# Patient Record
Sex: MALE | Race: WHITE | NOT HISPANIC OR LATINO | Employment: OTHER | URBAN - METROPOLITAN AREA
[De-identification: names, ages, dates, MRNs, and addresses within clinical notes are randomized per-mention and may not be internally consistent; named-entity substitution may affect disease eponyms.]

---

## 2020-08-04 ENCOUNTER — HISTORICAL (OUTPATIENT)
Dept: ADMINISTRATIVE | Facility: HOSPITAL | Age: 65
End: 2020-08-04

## 2020-08-04 LAB
ABS NEUT (OLG): 3.21 X10(3)/MCL (ref 2.1–9.2)
ALBUMIN SERPL-MCNC: 3.6 GM/DL (ref 3.4–5)
ALBUMIN/GLOB SERPL: 1.2 RATIO (ref 1.1–2)
ALP SERPL-CCNC: 79 UNIT/L (ref 40–150)
ALT SERPL-CCNC: 11 UNIT/L (ref 0–55)
AST SERPL-CCNC: 15 UNIT/L (ref 5–34)
BASOPHILS # BLD AUTO: 0 X10(3)/MCL (ref 0–0.2)
BASOPHILS NFR BLD AUTO: 1 %
BILIRUB SERPL-MCNC: 1.6 MG/DL
BILIRUBIN DIRECT+TOT PNL SERPL-MCNC: 0.5 MG/DL (ref 0–0.5)
BILIRUBIN DIRECT+TOT PNL SERPL-MCNC: 1.1 MG/DL (ref 0–0.8)
BUN SERPL-MCNC: 16 MG/DL (ref 8.4–25.7)
CALCIUM SERPL-MCNC: 8.9 MG/DL (ref 8.8–10)
CHLORIDE SERPL-SCNC: 102 MMOL/L (ref 98–107)
CHOLEST SERPL-MCNC: 156 MG/DL
CHOLEST/HDLC SERPL: 5 {RATIO} (ref 0–5)
CO2 SERPL-SCNC: 31 MMOL/L (ref 23–31)
CREAT SERPL-MCNC: 1.19 MG/DL (ref 0.73–1.18)
EOSINOPHIL # BLD AUTO: 0.2 X10(3)/MCL (ref 0–0.9)
EOSINOPHIL NFR BLD AUTO: 3 %
ERYTHROCYTE [DISTWIDTH] IN BLOOD BY AUTOMATED COUNT: 12.9 % (ref 11.5–17)
GLOBULIN SER-MCNC: 2.9 GM/DL (ref 2.4–3.5)
GLUCOSE SERPL-MCNC: 95 MG/DL (ref 82–115)
HCT VFR BLD AUTO: 44.1 % (ref 42–52)
HDLC SERPL-MCNC: 32 MG/DL (ref 35–60)
HGB BLD-MCNC: 14.5 GM/DL (ref 14–18)
LDLC SERPL CALC-MCNC: 102 MG/DL (ref 50–140)
LYMPHOCYTES # BLD AUTO: 1.4 X10(3)/MCL (ref 0.6–4.6)
LYMPHOCYTES NFR BLD AUTO: 26 %
MCH RBC QN AUTO: 31 PG (ref 27–31)
MCHC RBC AUTO-ENTMCNC: 32.9 GM/DL (ref 33–36)
MCV RBC AUTO: 94.4 FL (ref 80–94)
MONOCYTES # BLD AUTO: 0.5 X10(3)/MCL (ref 0.1–1.3)
MONOCYTES NFR BLD AUTO: 10 %
NEUTROPHILS # BLD AUTO: 3.21 X10(3)/MCL (ref 2.1–9.2)
NEUTROPHILS NFR BLD AUTO: 61 %
PLATELET # BLD AUTO: 288 X10(3)/MCL (ref 130–400)
PMV BLD AUTO: 10.6 FL (ref 9.4–12.4)
POTASSIUM SERPL-SCNC: 4.5 MMOL/L (ref 3.5–5.1)
PROT SERPL-MCNC: 6.5 GM/DL (ref 5.8–7.6)
RBC # BLD AUTO: 4.67 X10(6)/MCL (ref 4.7–6.1)
SODIUM SERPL-SCNC: 139 MMOL/L (ref 136–145)
TRIGL SERPL-MCNC: 108 MG/DL (ref 34–140)
VLDLC SERPL CALC-MCNC: 22 MG/DL
WBC # SPEC AUTO: 5.3 X10(3)/MCL (ref 4.5–11.5)

## 2021-08-16 ENCOUNTER — HISTORICAL (OUTPATIENT)
Dept: ADMINISTRATIVE | Facility: HOSPITAL | Age: 66
End: 2021-08-16

## 2021-08-16 LAB
ABS NEUT (OLG): 3.56 X10(3)/MCL (ref 2.1–9.2)
ALBUMIN SERPL-MCNC: 3.6 GM/DL (ref 3.4–4.8)
ALBUMIN/GLOB SERPL: 1.3 RATIO (ref 1.1–2)
ALP SERPL-CCNC: 77 UNIT/L (ref 40–150)
ALT SERPL-CCNC: 12 UNIT/L (ref 0–55)
AST SERPL-CCNC: 17 UNIT/L (ref 5–34)
BASOPHILS # BLD AUTO: 0 X10(3)/MCL (ref 0–0.2)
BASOPHILS NFR BLD AUTO: 1 %
BILIRUB SERPL-MCNC: 1.1 MG/DL
BILIRUBIN DIRECT+TOT PNL SERPL-MCNC: 0.4 MG/DL (ref 0–0.5)
BILIRUBIN DIRECT+TOT PNL SERPL-MCNC: 0.7 MG/DL (ref 0–0.8)
BUN SERPL-MCNC: 17.7 MG/DL (ref 8.4–25.7)
CALCIUM SERPL-MCNC: 9 MG/DL (ref 8.8–10)
CHLORIDE SERPL-SCNC: 100 MMOL/L (ref 98–107)
CHOLEST SERPL-MCNC: 172 MG/DL
CHOLEST/HDLC SERPL: 6 {RATIO} (ref 0–5)
CO2 SERPL-SCNC: 28 MMOL/L (ref 23–31)
CREAT SERPL-MCNC: 1.11 MG/DL (ref 0.73–1.18)
EOSINOPHIL # BLD AUTO: 0.2 X10(3)/MCL (ref 0–0.9)
EOSINOPHIL NFR BLD AUTO: 3 %
ERYTHROCYTE [DISTWIDTH] IN BLOOD BY AUTOMATED COUNT: 13.1 % (ref 11.5–17)
GLOBULIN SER-MCNC: 2.8 GM/DL (ref 2.4–3.5)
GLUCOSE SERPL-MCNC: 81 MG/DL (ref 82–115)
HCT VFR BLD AUTO: 42.9 % (ref 42–52)
HDLC SERPL-MCNC: 29 MG/DL (ref 35–60)
HGB BLD-MCNC: 13.9 GM/DL (ref 14–18)
LDLC SERPL CALC-MCNC: 111 MG/DL (ref 50–140)
LYMPHOCYTES # BLD AUTO: 1.6 X10(3)/MCL (ref 0.6–4.6)
LYMPHOCYTES NFR BLD AUTO: 27 %
MAGNESIUM SERPL-MCNC: 2.3 MG/DL (ref 1.6–2.6)
MCH RBC QN AUTO: 30.2 PG (ref 27–31)
MCHC RBC AUTO-ENTMCNC: 32.4 GM/DL (ref 33–36)
MCV RBC AUTO: 93.3 FL (ref 80–94)
MONOCYTES # BLD AUTO: 0.6 X10(3)/MCL (ref 0.1–1.3)
MONOCYTES NFR BLD AUTO: 10 %
NEUTROPHILS # BLD AUTO: 3.56 X10(3)/MCL (ref 2.1–9.2)
NEUTROPHILS NFR BLD AUTO: 58 %
PLATELET # BLD AUTO: 328 X10(3)/MCL (ref 130–400)
PMV BLD AUTO: 11.1 FL (ref 9.4–12.4)
POTASSIUM SERPL-SCNC: 4.2 MMOL/L (ref 3.5–5.1)
PROT SERPL-MCNC: 6.4 GM/DL (ref 5.8–7.6)
RBC # BLD AUTO: 4.6 X10(6)/MCL (ref 4.7–6.1)
SODIUM SERPL-SCNC: 138 MMOL/L (ref 136–145)
TRIGL SERPL-MCNC: 160 MG/DL (ref 34–140)
VLDLC SERPL CALC-MCNC: 32 MG/DL
WBC # SPEC AUTO: 6.1 X10(3)/MCL (ref 4.5–11.5)

## 2022-08-24 ENCOUNTER — HOSPITAL ENCOUNTER (EMERGENCY)
Facility: HOSPITAL | Age: 67
Discharge: LEFT BEFORE DISCHARGE | End: 2022-08-25
Payer: MEDICARE

## 2022-08-24 VITALS
DIASTOLIC BLOOD PRESSURE: 91 MMHG | SYSTOLIC BLOOD PRESSURE: 156 MMHG | HEIGHT: 67 IN | BODY MASS INDEX: 36.1 KG/M2 | HEART RATE: 80 BPM | RESPIRATION RATE: 20 BRPM | OXYGEN SATURATION: 96 % | WEIGHT: 230 LBS | TEMPERATURE: 98 F

## 2022-08-24 PROCEDURE — 99283 EMERGENCY DEPT VISIT LOW MDM: CPT | Mod: 25

## 2022-09-14 ENCOUNTER — HOSPITAL ENCOUNTER (INPATIENT)
Facility: HOSPITAL | Age: 67
LOS: 2 days | Discharge: HOME | DRG: 193 | End: 2022-09-17
Attending: EMERGENCY MEDICINE | Admitting: INTERNAL MEDICINE
Payer: MEDICARE

## 2022-09-14 DIAGNOSIS — R55 SYNCOPE: ICD-10-CM

## 2022-09-14 DIAGNOSIS — R07.81 RIB PAIN: ICD-10-CM

## 2022-09-14 DIAGNOSIS — R21 RASH: ICD-10-CM

## 2022-09-14 DIAGNOSIS — J18.9 PNEUMONIA OF RIGHT LOWER LOBE DUE TO INFECTIOUS ORGANISM: ICD-10-CM

## 2022-09-14 DIAGNOSIS — R07.9 ACUTE CHEST PAIN: ICD-10-CM

## 2022-09-14 DIAGNOSIS — R07.9 CHEST PAIN: ICD-10-CM

## 2022-09-14 DIAGNOSIS — Z87.891 EX-SMOKER: ICD-10-CM

## 2022-09-14 DIAGNOSIS — R09.02 HYPOXIA: ICD-10-CM

## 2022-09-14 DIAGNOSIS — R53.1 WEAKNESS: Primary | ICD-10-CM

## 2022-09-14 PROCEDURE — 94761 N-INVAS EAR/PLS OXIMETRY MLT: CPT

## 2022-09-14 PROCEDURE — 84484 ASSAY OF TROPONIN QUANT: CPT | Performed by: EMERGENCY MEDICINE

## 2022-09-14 PROCEDURE — 83880 ASSAY OF NATRIURETIC PEPTIDE: CPT | Performed by: EMERGENCY MEDICINE

## 2022-09-14 PROCEDURE — 85025 COMPLETE CBC W/AUTO DIFF WBC: CPT | Performed by: EMERGENCY MEDICINE

## 2022-09-14 PROCEDURE — 80053 COMPREHEN METABOLIC PANEL: CPT | Performed by: EMERGENCY MEDICINE

## 2022-09-14 PROCEDURE — 99291 CRITICAL CARE FIRST HOUR: CPT | Mod: 25

## 2022-09-14 PROCEDURE — 87636 SARSCOV2 & INF A&B AMP PRB: CPT | Performed by: EMERGENCY MEDICINE

## 2022-09-14 PROCEDURE — 96376 TX/PRO/DX INJ SAME DRUG ADON: CPT

## 2022-09-14 PROCEDURE — 83605 ASSAY OF LACTIC ACID: CPT | Performed by: EMERGENCY MEDICINE

## 2022-09-14 PROCEDURE — 93010 ELECTROCARDIOGRAM REPORT: CPT | Mod: ,,, | Performed by: INTERNAL MEDICINE

## 2022-09-14 PROCEDURE — 96365 THER/PROPH/DIAG IV INF INIT: CPT

## 2022-09-14 PROCEDURE — 36415 COLL VENOUS BLD VENIPUNCTURE: CPT | Performed by: EMERGENCY MEDICINE

## 2022-09-14 PROCEDURE — 96375 TX/PRO/DX INJ NEW DRUG ADDON: CPT

## 2022-09-14 PROCEDURE — 93005 ELECTROCARDIOGRAM TRACING: CPT

## 2022-09-14 PROCEDURE — 25000242 PHARM REV CODE 250 ALT 637 W/ HCPCS: Performed by: EMERGENCY MEDICINE

## 2022-09-14 PROCEDURE — 93010 EKG 12-LEAD: ICD-10-PCS | Mod: ,,, | Performed by: INTERNAL MEDICINE

## 2022-09-14 PROCEDURE — 94640 AIRWAY INHALATION TREATMENT: CPT

## 2022-09-14 RX ORDER — ACETAMINOPHEN AND CODEINE PHOSPHATE 300; 30 MG/1; MG/1
1 TABLET ORAL
COMMUNITY
Start: 2022-09-03

## 2022-09-14 RX ORDER — DICLOFENAC SODIUM 75 MG/1
TABLET, DELAYED RELEASE ORAL
Status: ON HOLD | COMMUNITY
Start: 2022-09-07 | End: 2022-09-17 | Stop reason: HOSPADM

## 2022-09-14 RX ORDER — HYDROCHLOROTHIAZIDE 25 MG/1
25 TABLET ORAL DAILY
Status: ON HOLD | COMMUNITY
Start: 2022-07-12 | End: 2022-09-17 | Stop reason: HOSPADM

## 2022-09-14 RX ORDER — IPRATROPIUM BROMIDE AND ALBUTEROL SULFATE 2.5; .5 MG/3ML; MG/3ML
3 SOLUTION RESPIRATORY (INHALATION)
Status: COMPLETED | OUTPATIENT
Start: 2022-09-14 | End: 2022-09-14

## 2022-09-14 RX ORDER — ISOSORBIDE DINITRATE 5 MG/1
5 TABLET ORAL DAILY
COMMUNITY
Start: 2022-09-10

## 2022-09-14 RX ORDER — PRAZOSIN HYDROCHLORIDE 2 MG/1
2 CAPSULE ORAL 2 TIMES DAILY
COMMUNITY
Start: 2022-09-14

## 2022-09-14 RX ORDER — ATORVASTATIN CALCIUM 10 MG/1
TABLET, FILM COATED ORAL
COMMUNITY
Start: 2022-09-14

## 2022-09-14 RX ORDER — AMLODIPINE BESYLATE 2.5 MG/1
2.5 TABLET ORAL DAILY
COMMUNITY
Start: 2022-07-25

## 2022-09-14 RX ORDER — CLOTRIMAZOLE AND BETAMETHASONE DIPROPIONATE 10; .64 MG/G; MG/G
CREAM TOPICAL
COMMUNITY
Start: 2022-09-07

## 2022-09-14 RX ADMIN — IPRATROPIUM BROMIDE AND ALBUTEROL SULFATE 3 ML: 2.5; .5 SOLUTION RESPIRATORY (INHALATION) at 11:09

## 2022-09-15 PROBLEM — R55 MICTURITION SYNCOPE: Status: ACTIVE | Noted: 2022-09-15

## 2022-09-15 PROBLEM — J18.9 CAP (COMMUNITY ACQUIRED PNEUMONIA): Status: ACTIVE | Noted: 2022-09-15

## 2022-09-15 LAB
ALBUMIN SERPL-MCNC: 3.9 GM/DL (ref 3.4–4.8)
ALBUMIN/GLOB SERPL: 1.3 RATIO (ref 1.1–2)
ALP SERPL-CCNC: 72 UNIT/L (ref 40–150)
ALT SERPL-CCNC: 21 UNIT/L (ref 0–55)
ANION GAP SERPL CALC-SCNC: 15 MEQ/L
APPEARANCE UR: CLEAR
AST SERPL-CCNC: 21 UNIT/L (ref 5–34)
BACTERIA #/AREA URNS AUTO: NORMAL /HPF
BASOPHILS # BLD AUTO: 0.04 X10(3)/MCL (ref 0–0.2)
BASOPHILS NFR BLD AUTO: 0.5 %
BILIRUB UR QL STRIP.AUTO: NEGATIVE MG/DL
BILIRUBIN DIRECT+TOT PNL SERPL-MCNC: 0.7 MG/DL
BNP BLD-MCNC: 20 PG/ML
BSA FOR ECHO PROCEDURE: 2.24 M2
BUN SERPL-MCNC: 20.9 MG/DL (ref 8.4–25.7)
BUN SERPL-MCNC: 21.5 MG/DL (ref 8.4–25.7)
CALCIUM SERPL-MCNC: 9.3 MG/DL (ref 8.8–10)
CALCIUM SERPL-MCNC: 9.7 MG/DL (ref 8.8–10)
CHLORIDE SERPL-SCNC: 101 MMOL/L (ref 98–107)
CHLORIDE SERPL-SCNC: 98 MMOL/L (ref 98–107)
CO2 SERPL-SCNC: 23 MMOL/L (ref 23–31)
CO2 SERPL-SCNC: 28 MMOL/L (ref 23–31)
COLOR UR AUTO: YELLOW
CREAT SERPL-MCNC: 1.2 MG/DL (ref 0.73–1.18)
CREAT SERPL-MCNC: 1.24 MG/DL (ref 0.73–1.18)
CREAT/UREA NIT SERPL: 17
EJECTION FRACTION: 60 %
EOSINOPHIL # BLD AUTO: 0.39 X10(3)/MCL (ref 0–0.9)
EOSINOPHIL NFR BLD AUTO: 4.6 %
ERYTHROCYTE [DISTWIDTH] IN BLOOD BY AUTOMATED COUNT: 12.9 % (ref 11.5–17)
ERYTHROCYTE [DISTWIDTH] IN BLOOD BY AUTOMATED COUNT: 13 % (ref 11.5–17)
FLUAV AG UPPER RESP QL IA.RAPID: NOT DETECTED
FLUBV AG UPPER RESP QL IA.RAPID: NOT DETECTED
GFR SERPLBLD CREATININE-BSD FMLA CKD-EPI: >60 MLS/MIN/1.73/M2
GFR SERPLBLD CREATININE-BSD FMLA CKD-EPI: >60 MLS/MIN/1.73/M2
GLOBULIN SER-MCNC: 3.1 GM/DL (ref 2.4–3.5)
GLUCOSE SERPL-MCNC: 133 MG/DL (ref 82–115)
GLUCOSE SERPL-MCNC: 206 MG/DL (ref 82–115)
GLUCOSE UR QL STRIP.AUTO: NEGATIVE MG/DL
HCT VFR BLD AUTO: 44.4 % (ref 42–52)
HCT VFR BLD AUTO: 44.7 % (ref 42–52)
HGB BLD-MCNC: 14.4 GM/DL (ref 14–18)
HGB BLD-MCNC: 14.7 GM/DL (ref 14–18)
IMM GRANULOCYTES # BLD AUTO: 0.08 X10(3)/MCL (ref 0–0.04)
IMM GRANULOCYTES NFR BLD AUTO: 1 %
KETONES UR QL STRIP.AUTO: NEGATIVE MG/DL
LACTATE SERPL-SCNC: 1.6 MMOL/L (ref 0.5–2.2)
LACTATE SERPL-SCNC: 2.5 MMOL/L (ref 0.5–2.2)
LACTATE SERPL-SCNC: 2.7 MMOL/L (ref 0.5–2.2)
LACTATE SERPL-SCNC: 4.2 MMOL/L (ref 0.5–2.2)
LACTATE SERPL-SCNC: 4.9 MMOL/L (ref 0.5–2.2)
LEFT VENTRICLE DIASTOLIC VOLUME INDEX: 54.38 ML/M2
LEFT VENTRICLE DIASTOLIC VOLUME: 118 ML
LEFT VENTRICLE SYSTOLIC VOLUME INDEX: 17.1 ML/M2
LEFT VENTRICLE SYSTOLIC VOLUME: 37.2 ML
LEUKOCYTE ESTERASE UR QL STRIP.AUTO: ABNORMAL UNIT/L
LYMPHOCYTES # BLD AUTO: 2.82 X10(3)/MCL (ref 0.6–4.6)
LYMPHOCYTES NFR BLD AUTO: 33.5 %
MAGNESIUM SERPL-MCNC: 1.9 MG/DL (ref 1.6–2.6)
MCH RBC QN AUTO: 31 PG (ref 27–31)
MCH RBC QN AUTO: 31.1 PG (ref 27–31)
MCHC RBC AUTO-ENTMCNC: 32.4 MG/DL (ref 33–36)
MCHC RBC AUTO-ENTMCNC: 32.9 MG/DL (ref 33–36)
MCV RBC AUTO: 94.7 FL (ref 80–94)
MCV RBC AUTO: 95.7 FL (ref 80–94)
MONOCYTES # BLD AUTO: 0.59 X10(3)/MCL (ref 0.1–1.3)
MONOCYTES NFR BLD AUTO: 7 %
NEUTROPHILS # BLD AUTO: 4.5 X10(3)/MCL (ref 2.1–9.2)
NEUTROPHILS NFR BLD AUTO: 53.4 %
NITRITE UR QL STRIP.AUTO: NEGATIVE
NRBC BLD AUTO-RTO: 0 %
NRBC BLD AUTO-RTO: 0 %
PH UR STRIP.AUTO: 5.5 [PH]
PHOSPHATE SERPL-MCNC: 4.8 MG/DL (ref 2.3–4.7)
PLATELET # BLD AUTO: 257 X10(3)/MCL (ref 130–400)
PLATELET # BLD AUTO: 297 X10(3)/MCL (ref 130–400)
PMV BLD AUTO: 10.9 FL (ref 7.4–10.4)
PMV BLD AUTO: 11.2 FL (ref 7.4–10.4)
POTASSIUM SERPL-SCNC: 3.4 MMOL/L (ref 3.5–5.1)
POTASSIUM SERPL-SCNC: 4.1 MMOL/L (ref 3.5–5.1)
PROT SERPL-MCNC: 7 GM/DL (ref 5.8–7.6)
PROT UR QL STRIP.AUTO: NEGATIVE MG/DL
RA PRESSURE: 3 MMHG
RBC # BLD AUTO: 4.64 X10(6)/MCL (ref 4.7–6.1)
RBC # BLD AUTO: 4.72 X10(6)/MCL (ref 4.7–6.1)
RBC #/AREA URNS AUTO: <5 /HPF
RBC UR QL AUTO: NEGATIVE UNIT/L
SARS-COV-2 RNA RESP QL NAA+PROBE: NOT DETECTED
SODIUM SERPL-SCNC: 136 MMOL/L (ref 136–145)
SODIUM SERPL-SCNC: 140 MMOL/L (ref 136–145)
SP GR UR STRIP.AUTO: 1.02 (ref 1–1.03)
SQUAMOUS #/AREA URNS AUTO: <5 /HPF
TROPONIN I SERPL-MCNC: <0.01 NG/ML (ref 0–0.04)
UROBILINOGEN UR STRIP-ACNC: 0.2 MG/DL
WBC # SPEC AUTO: 10 X10(3)/MCL (ref 4.5–11.5)
WBC # SPEC AUTO: 8.4 X10(3)/MCL (ref 4.5–11.5)
WBC #/AREA URNS AUTO: <5 /HPF

## 2022-09-15 PROCEDURE — 27000221 HC OXYGEN, UP TO 24 HOURS

## 2022-09-15 PROCEDURE — 63600175 PHARM REV CODE 636 W HCPCS

## 2022-09-15 PROCEDURE — 25500020 PHARM REV CODE 255: Performed by: EMERGENCY MEDICINE

## 2022-09-15 PROCEDURE — 80048 BASIC METABOLIC PNL TOTAL CA: CPT | Performed by: INTERNAL MEDICINE

## 2022-09-15 PROCEDURE — 94799 UNLISTED PULMONARY SVC/PX: CPT

## 2022-09-15 PROCEDURE — 94640 AIRWAY INHALATION TREATMENT: CPT

## 2022-09-15 PROCEDURE — 84484 ASSAY OF TROPONIN QUANT: CPT | Performed by: INTERNAL MEDICINE

## 2022-09-15 PROCEDURE — 84100 ASSAY OF PHOSPHORUS: CPT | Performed by: INTERNAL MEDICINE

## 2022-09-15 PROCEDURE — 25000242 PHARM REV CODE 250 ALT 637 W/ HCPCS: Performed by: INTERNAL MEDICINE

## 2022-09-15 PROCEDURE — 85027 COMPLETE CBC AUTOMATED: CPT | Performed by: INTERNAL MEDICINE

## 2022-09-15 PROCEDURE — 36415 COLL VENOUS BLD VENIPUNCTURE: CPT | Performed by: INTERNAL MEDICINE

## 2022-09-15 PROCEDURE — 99900031 HC PATIENT EDUCATION (STAT)

## 2022-09-15 PROCEDURE — 25000003 PHARM REV CODE 250: Performed by: HOSPITALIST

## 2022-09-15 PROCEDURE — 63600175 PHARM REV CODE 636 W HCPCS: Performed by: INTERNAL MEDICINE

## 2022-09-15 PROCEDURE — 63700000 PHARM REV CODE 250 ALT 637 W/O HCPCS: Performed by: INTERNAL MEDICINE

## 2022-09-15 PROCEDURE — 25000003 PHARM REV CODE 250: Performed by: INTERNAL MEDICINE

## 2022-09-15 PROCEDURE — 81001 URINALYSIS AUTO W/SCOPE: CPT | Performed by: INTERNAL MEDICINE

## 2022-09-15 PROCEDURE — 83735 ASSAY OF MAGNESIUM: CPT | Performed by: INTERNAL MEDICINE

## 2022-09-15 PROCEDURE — 63600175 PHARM REV CODE 636 W HCPCS: Performed by: EMERGENCY MEDICINE

## 2022-09-15 PROCEDURE — 25000003 PHARM REV CODE 250: Performed by: EMERGENCY MEDICINE

## 2022-09-15 PROCEDURE — 94761 N-INVAS EAR/PLS OXIMETRY MLT: CPT

## 2022-09-15 PROCEDURE — 83605 ASSAY OF LACTIC ACID: CPT | Performed by: EMERGENCY MEDICINE

## 2022-09-15 PROCEDURE — 11000001 HC ACUTE MED/SURG PRIVATE ROOM

## 2022-09-15 RX ORDER — TALC
6 POWDER (GRAM) TOPICAL NIGHTLY PRN
Status: DISCONTINUED | OUTPATIENT
Start: 2022-09-15 | End: 2022-09-17 | Stop reason: HOSPADM

## 2022-09-15 RX ORDER — ENOXAPARIN SODIUM 100 MG/ML
40 INJECTION SUBCUTANEOUS EVERY 24 HOURS
Status: DISCONTINUED | OUTPATIENT
Start: 2022-09-15 | End: 2022-09-17 | Stop reason: HOSPADM

## 2022-09-15 RX ORDER — ONDANSETRON 2 MG/ML
4 INJECTION INTRAMUSCULAR; INTRAVENOUS
Status: COMPLETED | OUTPATIENT
Start: 2022-09-15 | End: 2022-09-15

## 2022-09-15 RX ORDER — ALBUTEROL SULFATE 0.83 MG/ML
2.5 SOLUTION RESPIRATORY (INHALATION) EVERY 4 HOURS PRN
Status: DISCONTINUED | OUTPATIENT
Start: 2022-09-15 | End: 2022-09-17 | Stop reason: HOSPADM

## 2022-09-15 RX ORDER — AMOXICILLIN 250 MG
1 CAPSULE ORAL 2 TIMES DAILY PRN
Status: DISCONTINUED | OUTPATIENT
Start: 2022-09-15 | End: 2022-09-17 | Stop reason: HOSPADM

## 2022-09-15 RX ORDER — SIMETHICONE 80 MG
1 TABLET,CHEWABLE ORAL 4 TIMES DAILY PRN
Status: DISCONTINUED | OUTPATIENT
Start: 2022-09-15 | End: 2022-09-17 | Stop reason: HOSPADM

## 2022-09-15 RX ORDER — POTASSIUM CHLORIDE 20 MEQ/1
40 TABLET, EXTENDED RELEASE ORAL ONCE
Status: COMPLETED | OUTPATIENT
Start: 2022-09-15 | End: 2022-09-15

## 2022-09-15 RX ORDER — SODIUM CHLORIDE, SODIUM LACTATE, POTASSIUM CHLORIDE, CALCIUM CHLORIDE 600; 310; 30; 20 MG/100ML; MG/100ML; MG/100ML; MG/100ML
INJECTION, SOLUTION INTRAVENOUS CONTINUOUS
Status: DISCONTINUED | OUTPATIENT
Start: 2022-09-15 | End: 2022-09-15

## 2022-09-15 RX ORDER — GUAIFENESIN/DEXTROMETHORPHAN 100-10MG/5
5 SYRUP ORAL EVERY 4 HOURS PRN
Status: DISCONTINUED | OUTPATIENT
Start: 2022-09-15 | End: 2022-09-17 | Stop reason: HOSPADM

## 2022-09-15 RX ORDER — METHOCARBAMOL 750 MG/1
750 TABLET, FILM COATED ORAL 4 TIMES DAILY PRN
Status: DISCONTINUED | OUTPATIENT
Start: 2022-09-15 | End: 2022-09-17 | Stop reason: HOSPADM

## 2022-09-15 RX ORDER — HYDROMORPHONE HYDROCHLORIDE 2 MG/ML
1 INJECTION, SOLUTION INTRAMUSCULAR; INTRAVENOUS; SUBCUTANEOUS
Status: COMPLETED | OUTPATIENT
Start: 2022-09-15 | End: 2022-09-15

## 2022-09-15 RX ORDER — KETOROLAC TROMETHAMINE 30 MG/ML
15 INJECTION, SOLUTION INTRAMUSCULAR; INTRAVENOUS
Status: COMPLETED | OUTPATIENT
Start: 2022-09-15 | End: 2022-09-15

## 2022-09-15 RX ORDER — AZITHROMYCIN 250 MG/1
500 TABLET, FILM COATED ORAL NIGHTLY
Status: DISCONTINUED | OUTPATIENT
Start: 2022-09-15 | End: 2022-09-16

## 2022-09-15 RX ORDER — FUROSEMIDE 10 MG/ML
40 INJECTION INTRAMUSCULAR; INTRAVENOUS
Status: COMPLETED | OUTPATIENT
Start: 2022-09-15 | End: 2022-09-15

## 2022-09-15 RX ORDER — HYDROCODONE BITARTRATE AND ACETAMINOPHEN 5; 325 MG/1; MG/1
1 TABLET ORAL EVERY 6 HOURS PRN
Status: DISCONTINUED | OUTPATIENT
Start: 2022-09-15 | End: 2022-09-16

## 2022-09-15 RX ORDER — ACETAMINOPHEN AND CODEINE PHOSPHATE 300; 30 MG/1; MG/1
1 TABLET ORAL EVERY 4 HOURS PRN
Status: DISCONTINUED | OUTPATIENT
Start: 2022-09-15 | End: 2022-09-17 | Stop reason: HOSPADM

## 2022-09-15 RX ORDER — FAMOTIDINE 20 MG/1
20 TABLET, FILM COATED ORAL 2 TIMES DAILY
Status: DISCONTINUED | OUTPATIENT
Start: 2022-09-15 | End: 2022-09-17 | Stop reason: HOSPADM

## 2022-09-15 RX ORDER — SODIUM CHLORIDE 0.9 % (FLUSH) 0.9 %
10 SYRINGE (ML) INJECTION
Status: DISCONTINUED | OUTPATIENT
Start: 2022-09-15 | End: 2022-09-17 | Stop reason: HOSPADM

## 2022-09-15 RX ORDER — ONDANSETRON 2 MG/ML
4 INJECTION INTRAMUSCULAR; INTRAVENOUS ONCE
Status: COMPLETED | OUTPATIENT
Start: 2022-09-15 | End: 2022-09-15

## 2022-09-15 RX ORDER — POLYETHYLENE GLYCOL 3350 17 G/17G
17 POWDER, FOR SOLUTION ORAL 2 TIMES DAILY PRN
Status: DISCONTINUED | OUTPATIENT
Start: 2022-09-15 | End: 2022-09-17 | Stop reason: HOSPADM

## 2022-09-15 RX ORDER — ACETAMINOPHEN 325 MG/1
650 TABLET ORAL EVERY 4 HOURS PRN
Status: DISCONTINUED | OUTPATIENT
Start: 2022-09-15 | End: 2022-09-17 | Stop reason: HOSPADM

## 2022-09-15 RX ORDER — MAG HYDROX/ALUMINUM HYD/SIMETH 200-200-20
30 SUSPENSION, ORAL (FINAL DOSE FORM) ORAL 4 TIMES DAILY PRN
Status: DISCONTINUED | OUTPATIENT
Start: 2022-09-15 | End: 2022-09-17 | Stop reason: HOSPADM

## 2022-09-15 RX ORDER — BENZONATATE 100 MG/1
200 CAPSULE ORAL 3 TIMES DAILY PRN
Status: DISCONTINUED | OUTPATIENT
Start: 2022-09-15 | End: 2022-09-17 | Stop reason: HOSPADM

## 2022-09-15 RX ORDER — ONDANSETRON 2 MG/ML
4 INJECTION INTRAMUSCULAR; INTRAVENOUS EVERY 4 HOURS PRN
Status: DISCONTINUED | OUTPATIENT
Start: 2022-09-15 | End: 2022-09-17 | Stop reason: HOSPADM

## 2022-09-15 RX ORDER — ONDANSETRON 2 MG/ML
INJECTION INTRAMUSCULAR; INTRAVENOUS
Status: COMPLETED
Start: 2022-09-15 | End: 2022-09-15

## 2022-09-15 RX ORDER — ACETAMINOPHEN 500 MG
1000 TABLET ORAL EVERY 6 HOURS PRN
Status: DISCONTINUED | OUTPATIENT
Start: 2022-09-15 | End: 2022-09-17 | Stop reason: HOSPADM

## 2022-09-15 RX ORDER — PROCHLORPERAZINE EDISYLATE 5 MG/ML
5 INJECTION INTRAMUSCULAR; INTRAVENOUS EVERY 6 HOURS PRN
Status: DISCONTINUED | OUTPATIENT
Start: 2022-09-15 | End: 2022-09-17 | Stop reason: HOSPADM

## 2022-09-15 RX ORDER — BISOPROLOL FUMARATE AND HYDROCHLOROTHIAZIDE 5; 6.25 MG/1; MG/1
1 TABLET ORAL DAILY
COMMUNITY
Start: 2022-06-27

## 2022-09-15 RX ADMIN — HYDROMORPHONE HYDROCHLORIDE 1 MG: 2 INJECTION, SOLUTION INTRAMUSCULAR; INTRAVENOUS; SUBCUTANEOUS at 02:09

## 2022-09-15 RX ADMIN — ACETAMINOPHEN AND CODEINE PHOSPHATE 1 TABLET: 300; 30 TABLET ORAL at 08:09

## 2022-09-15 RX ADMIN — MELATONIN TAB 3 MG 6 MG: 3 TAB at 08:09

## 2022-09-15 RX ADMIN — FUROSEMIDE 40 MG: 10 INJECTION, SOLUTION INTRAMUSCULAR; INTRAVENOUS at 12:09

## 2022-09-15 RX ADMIN — IOPAMIDOL 100 ML: 755 INJECTION, SOLUTION INTRAVENOUS at 01:09

## 2022-09-15 RX ADMIN — FAMOTIDINE 20 MG: 20 TABLET, FILM COATED ORAL at 08:09

## 2022-09-15 RX ADMIN — AZITHROMYCIN MONOHYDRATE 500 MG: 500 INJECTION, POWDER, LYOPHILIZED, FOR SOLUTION INTRAVENOUS at 03:09

## 2022-09-15 RX ADMIN — ONDANSETRON 4 MG: 2 INJECTION INTRAMUSCULAR; INTRAVENOUS at 03:09

## 2022-09-15 RX ADMIN — POTASSIUM CHLORIDE 40 MEQ: 1500 TABLET, EXTENDED RELEASE ORAL at 04:09

## 2022-09-15 RX ADMIN — CEFTRIAXONE SODIUM 1 G: 1 INJECTION, POWDER, FOR SOLUTION INTRAMUSCULAR; INTRAVENOUS at 02:09

## 2022-09-15 RX ADMIN — ALBUTEROL SULFATE 2.5 MG: 2.5 SOLUTION RESPIRATORY (INHALATION) at 07:09

## 2022-09-15 RX ADMIN — HYDROMORPHONE HYDROCHLORIDE 1 MG: 2 INJECTION, SOLUTION INTRAMUSCULAR; INTRAVENOUS; SUBCUTANEOUS at 12:09

## 2022-09-15 RX ADMIN — METHOCARBAMOL 750 MG: 750 TABLET ORAL at 08:09

## 2022-09-15 RX ADMIN — CEFTRIAXONE SODIUM 1 G: 1 INJECTION, POWDER, FOR SOLUTION INTRAMUSCULAR; INTRAVENOUS at 08:09

## 2022-09-15 RX ADMIN — SODIUM CHLORIDE, POTASSIUM CHLORIDE, SODIUM LACTATE AND CALCIUM CHLORIDE: 600; 310; 30; 20 INJECTION, SOLUTION INTRAVENOUS at 03:09

## 2022-09-15 RX ADMIN — AZITHROMYCIN MONOHYDRATE 500 MG: 250 TABLET ORAL at 08:09

## 2022-09-15 RX ADMIN — ONDANSETRON 4 MG: 2 INJECTION INTRAMUSCULAR; INTRAVENOUS at 12:09

## 2022-09-15 RX ADMIN — SODIUM CHLORIDE, POTASSIUM CHLORIDE, SODIUM LACTATE AND CALCIUM CHLORIDE 1000 ML: 600; 310; 30; 20 INJECTION, SOLUTION INTRAVENOUS at 02:09

## 2022-09-15 RX ADMIN — ACETAMINOPHEN AND CODEINE PHOSPHATE 1 TABLET: 300; 30 TABLET ORAL at 03:09

## 2022-09-15 RX ADMIN — SODIUM BICARBONATE: 84 INJECTION, SOLUTION INTRAVENOUS at 04:09

## 2022-09-15 RX ADMIN — KETOROLAC TROMETHAMINE 15 MG: 30 INJECTION, SOLUTION INTRAMUSCULAR; INTRAVENOUS at 02:09

## 2022-09-15 RX ADMIN — ENOXAPARIN SODIUM 40 MG: 40 INJECTION SUBCUTANEOUS at 04:09

## 2022-09-15 NOTE — H&P
[facility] Hospital Medicine   History & Physical Note      Patient Name: Dallin Heath  : 1955  MRN: [id number]  Patient Class: IP- Inpatient   Admission Date: 2022   Length of Stay: 0  Admitting Physician:  Service  Attending Physician: Caroline Chand MD  PCP: Primary Doctor No  Source of history: Patient, patient's family, and EMR.   Face-to-Face encounter date: 09/15/2022  Code status: --Full      Chief Complaint   Shortness of Breath (Prescribed new med by Cardio, took 1st dose at 1900 tonight. Got up to go to bathroom at approx 2200, +weakness. Fell to knees. + L lower ribs pain. SPO2 on EMS arrival 88%. Placed on 4L NC, only up to 89%. Diaphoretic on arrival)      History of Present Illness   Mr. Hetah is a 67 year old male who presented to the Ed with c/o sob, syncope at home tonight.  He states that he was seen by his cardiologist today for a routine checkup and had an US which the patient states he was told he was fine and was prescribed prazosin due to urinary difficulty.  He states he took the medication this evening along with a sleep aid and back pain medication. He states he got up around 10 p.m. to urinate and had a syncopal episode.  Wife states that patient has been having back pain x several weeks and not moving around much. Today's Ed lab work revealed K+ 3.4, Cr 1.2, lactate 2.7. Troponin, BNP wnl. Flu/Covid negative. CTA negative for PE. Showed basal atelectatic bands with patchy elements at posterior right lung base.  He was given azithromycin and rocephin in the ED. He was admitted to hospital medicine for management.    ROS   Except as documented, all other systems reviewed and negative     Past Medical History     Past Medical History:   Diagnosis Date    HLD (hyperlipidemia)     Hypertension        Past Surgical History   Denies past surgery    Social History     Social History     Tobacco Use    Smoking status: Former     Types: Cigarettes     Smokeless tobacco: Never   Substance Use Topics    Alcohol use: Yes        Family History   Reviewed and negative    Allergies   Patient has no known allergies.    Home Medications     Prior to Admission medications    Medication Sig Start Date End Date Taking? Authorizing Provider   acetaminophen-codeine 300-30mg (TYLENOL #3) 300-30 mg Tab Take 1 tablet by mouth. 9/3/22   Historical Provider   amLODIPine (NORVASC) 2.5 MG tablet Take 2.5 mg by mouth once daily. 22   Historical Provider   atorvastatin (LIPITOR) 10 MG tablet Take by mouth. 22   Historical Provider   clotrimazole-betamethasone 1-0.05% (LOTRISONE) cream APPLY TO THE AFFECTED AREA(S) TWICE DAILY 22   Historical Provider   diclofenac (VOLTAREN) 75 MG EC tablet SMARTSI Tablet(s) By Mouth Every 12 Hours PRN 22   Historical Provider   hydroCHLOROthiazide (HYDRODIURIL) 25 MG tablet Take 25 mg by mouth once daily. 22   Historical Provider   isosorbide dinitrate (ISORDIL) 5 MG Tab Take 5 mg by mouth once daily. 9/10/22   Historical Provider   prazosin (MINIPRESS) 2 MG Cap Take 2 mg by mouth 2 (two) times daily. 22   Historical Provider        Inpatient Medications   Scheduled Meds   azithromycin  500 mg Intravenous ED 1 Time    azithromycin  500 mg Oral QHS    cefTRIAXone (ROCEPHIN) IVPB  1 g Intravenous Q24H    enoxaparin  40 mg Subcutaneous Daily    famotidine  20 mg Oral BID    potassium chloride  40 mEq Oral Once     Continuous Infusions   lactated ringers       PRN Meds      Physical Exam   Vital Signs  Temp:  [97.5 °F (36.4 °C)]   Pulse:  [87-99]   Resp:  [16-22]   BP: (121-164)/()   SpO2:  [88 %-95 %]       General: Awake, alert, oriented, currently vomiting.  HEENT: NC/AT  Neck:  Supple  Chest: Clear bilaterally, no wheezing or rales, no accessory muscle use. O2 via face mask in place  CVS: Regular rhythm. Tachycardic. Normal S1/S2. Lower left chest wall ttp  Abdomen: nondistended, normoactive BS, soft and  non-tender.  MSK: No obvious deformity or joint swelling  Skin: Warm and dry  Neuro: AAOx3, no focal neurological deficit  Psych: Cooperative    Labs     Recent Labs     09/14/22  2337   WBC 8.4   RBC 4.72   HGB 14.7   HCT 44.7   MCV 94.7*   MCH 31.1*   MCHC 32.9*   RDW 12.9        Recent Labs     09/14/22  2338   LACTIC 2.7*     No results for input(s): INR, APTT, D-DIMER in the last 72 hours.  No results for input(s): HGBA1C, CHOL, TRIG, LDL, VLDL, HDL in the last 72 hours.   Recent Labs     09/14/22  2337      K 3.4*   CHLORIDE 101   CO2 28   BUN 20.9   CREATININE 1.20*   GLUCOSE 133*   CALCIUM 9.7   ALBUMIN 3.9   GLOBULIN 3.1   ALKPHOS 72   ALT 21   AST 21   BILITOT 0.7     Recent Labs     09/14/22  2337   BNP 20.0   TROPONINI <0.010          Microbiology Results (last 7 days)       ** No results found for the last 168 hours. **           Imaging     CTA Chest Non-Coronary(PE Studies)         X-Ray Chest AP Portable    (Results Pending)     Assessment & Plan   Syncope likely micturition  Community acquired pneumonia vs atelectasis  Acute kidney injury  Lactic acidosis  Hypokalemia      Plan:  -Telemetry monitoring  -O2 delivery as needed  -Azithromycin 500 mg q 24 hours  -Rocephin 1 gram q 24 hours  -Potassium replacement  -LR @ 100 ml/hr  -Duonebs q4 prn  -Pain control  -IS at bedside  -Echo pending  -Carotid US pending  -Repeat lactate  -Troponin q 6 hours until peak  -Resume home meds as appropriate once reconciled  -Labs in AM      VTE Prophylaxis: Lovenox    I, Elise Erickson NP have reviewed and discussed this case with Dr. Chand.  Please see addendum for further assessment and plan from attending MD.

## 2022-09-15 NOTE — ED PROVIDER NOTES
"Encounter Date: 9/14/2022    SCRIBE #1 NOTE: I, Delia Clark, am scribing for, and in the presence of,  Jake Monson MD. I have scribed the following portions of the note - Other sections scribed: HPI, ROS, PE.     History     Chief Complaint   Patient presents with    Shortness of Breath     Prescribed new med by Cardio, took 1st dose at 1900 tonight. Got up to go to bathroom at approx 2200, +weakness. Fell to knees. + L lower ribs pain. SPO2 on EMS arrival 88%. Placed on 4L NC, only up to 89%. Diaphoretic on arrival       This is a 67 y.o. male, with a PMHx of HTN and HLD, who presents with complaint of shortness of breath after passing out at home.   Pt reports that he went to his cardiologist, Dr. Morales, for yearly routine visit, mentioned some urinary difficulty and was given a "new medicine". Pt states that he took the new medication at approximately 7 PM, and he took his other medications as well as a sleep aid. Pt notes that at around 10 PM he went to go use the restroom when he felt weak and dizzy, which caused him to fall. Pt reports that he has the urgency to urinate, but cannot urinate. Pt adds that he is experiencing SOB. Pt reports that it is hard to breathe. He denies SOB when lying flat or waking up out of his sleep because of the SOB. He denies being on any home oxygen. Pt adds that he has a rash on his left chest area that appeared 1 week ago. He states that the rash was not painful until today. Pt describes the rash pain as a burning sensation. He denies any contact with poison ivy or poison oak. Pt denies fever, cough, and abdominal pain. Pt reports that he quit smoking cigarettes.       The history is provided by the patient.   Male  Problem  Primary symptoms comment: Difficulty urinating. This is a new problem. The current episode started several days ago. Pertinent negatives include no nausea, no vomiting and no abdominal pain.   Review of patient's allergies indicates:  No Known " Allergies  Past Medical History:   Diagnosis Date    HLD (hyperlipidemia)     Hypertension      No past surgical history on file.  No family history on file.  Social History     Tobacco Use    Smoking status: Never    Smokeless tobacco: Never     Review of Systems   Constitutional:  Negative for chills and fever.   Respiratory:  Positive for shortness of breath. Negative for cough and chest tightness.    Cardiovascular:  Negative for chest pain.   Gastrointestinal:  Negative for abdominal pain, nausea and vomiting.   Genitourinary:  Positive for difficulty urinating and urgency.   Musculoskeletal:  Negative for myalgias and neck pain.   Skin:  Positive for rash.   Neurological:  Positive for dizziness and weakness. Negative for syncope.   All other systems reviewed and are negative.    Physical Exam     Initial Vitals [09/14/22 2305]   BP Pulse Resp Temp SpO2   (!) 156/79 98 20 97.5 °F (36.4 °C) (!) 88 %      MAP       --         Physical Exam    Nursing note and vitals reviewed.  Constitutional: He appears well-developed and well-nourished. He appears distressed.   HENT:   Head: Normocephalic and atraumatic.   Eyes: Conjunctivae are normal.   Cardiovascular:  Normal rate.           Pulmonary/Chest: He has no rhonchi. He exhibits no tenderness.   Mild expiratory wheezes. Crackles.   Abdominal: Abdomen is soft. He exhibits no distension. There is no abdominal tenderness. There is no rebound and no guarding.   Musculoskeletal:         General: Normal range of motion.     Neurological: He is alert and oriented to person, place, and time. He has normal strength.   Skin: Skin is warm and dry.   Pale pink rash that is raised on the left lower chest. Dermatome pattern. No vesicles.   Psychiatric: He has a normal mood and affect.       ED Course   Critical Care    Date/Time: 9/15/2022 2:54 AM  Performed by: Jake Monson MD  Authorized by: Jake Monson MD   Total critical care time (exclusive of procedural time) :  33 minutes  Critical care was necessary to treat or prevent imminent or life-threatening deterioration of the following conditions: cardiac failure, circulatory failure, sepsis and respiratory failure.  Critical care was time spent personally by me on the following activities: evaluation of patient's response to treatment, examination of patient, ordering and performing treatments and interventions, ordering and review of laboratory studies, ordering and review of radiographic studies, pulse oximetry and re-evaluation of patient's condition.      Labs Reviewed   COMPREHENSIVE METABOLIC PANEL - Abnormal; Notable for the following components:       Result Value    Potassium Level 3.4 (*)     Glucose Level 133 (*)     Creatinine 1.20 (*)     All other components within normal limits   LACTIC ACID, PLASMA - Abnormal; Notable for the following components:    Lactic Acid Level 2.7 (*)     All other components within normal limits   CBC WITH DIFFERENTIAL - Abnormal; Notable for the following components:    MCV 94.7 (*)     MCH 31.1 (*)     MCHC 32.9 (*)     MPV 11.2 (*)     IG# 0.08 (*)     All other components within normal limits   COVID/FLU A&B PCR - Normal   TROPONIN I - Normal   B-TYPE NATRIURETIC PEPTIDE - Normal   CBC W/ AUTO DIFFERENTIAL    Narrative:     The following orders were created for panel order CBC auto differential.  Procedure                               Abnormality         Status                     ---------                               -----------         ------                     CBC with Differential[[id number]]        Abnormal            Final result                 Please view results for these tests on the individual orders.   LACTIC ACID, PLASMA          Imaging Results              CTA Chest Non-Coronary(PE Studies) (Preliminary result)  Result time 09/15/22 01:32:14      Preliminary result by Paulo Rice Jr., MD (09/15/22 01:32:14)                   Narrative:    START OF  REPORT:  Technique: CT Scan of the chest was performed with intravenous contrast with direct axial images as well as sagittal and coronal reconstruction images pulmonary embolus protocol.    Dosage Information: Automated Exposure Control was utilized.    Comparison: None.    Clinical History: Prescribed new med by Cardio, took 1st dose at 1900 tonight. Got up to go to bathroom at approx 2200, +weakness. Fell to knees. + L lower ribs pain. SPO2 on EMS arrival 88%. Placed on 4L NC, only up to 89%. Diaphoretic on arrival.    Findings:  Soft Tissues: Unremarkable.  Lines and Tubes: None.  Axilla: A few prominent lymph nodes are seen in the axilla.  Neck: The visualized soft tissues of the neck appear unremarkable. The thyroid gland appear unremarkable.  Mediastinum: The mediastinal structures are within normal limits.  Heart: The heart appears unremarkable.  Aorta: Moderate aortic calcification is seen in the thoracic aorta. There is right sided aortic arch seen.  Pulmonary Arteries: No filling defects are seen in the pulmonary arteries to suggest pulmonary embolus.  Lungs: There is pronounced non specific dependent change at the lung bases. Basal atelectatic bands. Patchy elements at the posterior right lung base. A superimposed developing pneumonia here is not excluded.  Pleura: No effusions or pneumothorax are identified.  Bony Structures:  Spine: Moderate spondylolytic changes are seen in the thoracic spine. Schmorl's node is seen in the inferior endplate of D12 vertebral body.  Ribs: The ribs appear unremarkable.  Abdomen: There is a well defined hypodense lesion with peripheral vascularity seen in segment IV B of the liver on series 8 image 312. The findings may be suggestive of a haemangioma.      Impression:  1. No filling defects are seen in the pulmonary arteries to suggest pulmonary embolus.  2. Basal atelectatic bands. Patchy elements at the posterior right lung base. A superimposed developing pneumonia here  is not excluded.  3. Details and other findings as discussed above.                          Preliminary result by Interface, Rad Results In (09/15/22 01:32:14)                   Narrative:    START OF REPORT:  Technique: CT Scan of the chest was performed with intravenous contrast with direct axial images as well as sagittal and coronal reconstruction images pulmonary embolus protocol.    Dosage Information: Automated Exposure Control was utilized.    Comparison: None.    Clinical History: Prescribed new med by Cardio, took 1st dose at 1900 tonight. Got up to go to bathroom at approx 2200, +weakness. Fell to knees. + L lower ribs pain. SPO2 on EMS arrival 88%. Placed on 4L NC, only up to 89%. Diaphoretic on arrival.    Findings:  Soft Tissues: Unremarkable.  Lines and Tubes: None.  Axilla: A few prominent lymph nodes are seen in the axilla.  Neck: The visualized soft tissues of the neck appear unremarkable. The thyroid gland appear unremarkable.  Mediastinum: The mediastinal structures are within normal limits.  Heart: The heart appears unremarkable.  Aorta: Moderate aortic calcification is seen in the thoracic aorta. There is right sided aortic arch seen.  Pulmonary Arteries: No filling defects are seen in the pulmonary arteries to suggest pulmonary embolus.  Lungs: There is pronounced non specific dependent change at the lung bases. Basal atelectatic bands. Patchy elements at the posterior right lung base. A superimposed developing pneumonia here is not excluded.  Pleura: No effusions or pneumothorax are identified.  Bony Structures:  Spine: Moderate spondylolytic changes are seen in the thoracic spine. Schmorl's node is seen in the inferior endplate of D12 vertebral body.  Ribs: The ribs appear unremarkable.  Abdomen: There is a well defined hypodense lesion with peripheral vascularity seen in segment IV B of the liver on series 8 image 312. The findings may be suggestive of a haemangioma.      Impression:  1.  No filling defects are seen in the pulmonary arteries to suggest pulmonary embolus.  2. Basal atelectatic bands. Patchy elements at the posterior right lung base. A superimposed developing pneumonia here is not excluded.  3. Details and other findings as discussed above.                                         X-Ray Chest AP Portable (In process)                      Medications   cefTRIAXone (ROCEPHIN) 1 g in dextrose 5 % in water (D5W) 5 % 50 mL IVPB (MB+) (1 g Intravenous New Bag 9/15/22 0228)   azithromycin 500 mg in dextrose 5 % 250 mL IVPB (ready to mix system) (has no administration in time range)   lactated ringers bolus 1,000 mL (1,000 mLs Intravenous New Bag 9/15/22 0227)   albuterol-ipratropium 2.5 mg-0.5 mg/3 mL nebulizer solution 3 mL (3 mLs Nebulization Given 9/14/22 2348)   HYDROmorphone (PF) injection 1 mg (1 mg Intravenous Given 9/15/22 0015)   ondansetron injection 4 mg (4 mg Intravenous Given 9/15/22 0015)   furosemide injection 40 mg (40 mg Intravenous Given 9/15/22 0019)   iopamidoL (ISOVUE-370) injection 100 mL (100 mLs Intravenous Given 9/15/22 0119)   ketorolac injection 15 mg (15 mg Intravenous Given 9/15/22 0228)   HYDROmorphone (PF) injection 1 mg (1 mg Intravenous Given 9/15/22 0227)     Medical Decision Making:   History:   Old Medical Records: I decided to obtain old medical records.  Differential Diagnosis:   CHF, COPD exacerbation, mi, pneumonia, musculoskeletal pain, shingles  Independently Interpreted Test(s):   I have ordered and independently interpreted X-rays - see prior notes.  Clinical Tests:   Lab Tests: Ordered and Reviewed  Radiological Study: Ordered and Reviewed  ED Management:  Patient with a syncopal episode after he felt weak and dizzy.  He attributed this to a new medication he was given he also had taken a sleeping pill.  Had to be hypoxic when EMS arrived and was hypoxic care that he was awake and alert.  Mild expiratory wheeze and some crackles on exam he does have  a previous smoking history so COPD is certainly considered.  No history of CHF is on isosorbide mononitrate with crackles or considered diagnosis of congestive heart failure.  Initial x-ray was an AP view in the mediastinum and heart did appear enlarged with CT scan did not show this.  CT was obtained to rule out pulmonary embolus which fortunately was negative for that but did show evidence of a pneumonia.  He has not been running fever he has not had a productive cough both imaging finding hypoxia on exam will admit treat for pneumonia.  Rocephin and azithromycin been started.  I discussed with he and the woman at bedside that the rash in the distribution with amount of pain is experiencing shingles should be considered however it looks more like an atopic dermatitis he believes it was from a soap that he used and is using an ointment given his primary provider, clotrimazole betamethasone.  He has no tenderness on the bladder on palpation.  Bladder scan showed 200 cc and he was able the spontaneously void here.  No evidence of acute urinary retention and renal failure today.        Scribe Attestation:   Scribe #1: I performed the above scribed service and the documentation accurately describes the services I performed. I attest to the accuracy of the note.    Attending Attestation:           Physician Attestation for Scribe:  Physician Attestation Statement for Scribe #1: I, reviewed documentation, as scribed by Delia Clark in my presence, and it is both accurate and complete.           ED Course as of 09/15/22 0259   Thu Sep 15, 2022   0222 With shortness of breath hypoxia crackles on exam CHF is considered.  He is on isosorbide but he denies a history of heart failure.  I do not see a documented history of heart failure in his record anywhere.  On the portable chest x-ray mediastinum and heart did appear wide however on CT they are of normal size.  BNP is only 20 lactate is elevated and CT concern for pneumonia  so at this point I will add lactate Ringer's to hydrate him.  Rocephin and azithromycin started as well.  He received a DuoNeb it is the slight wheezing and history of tobacco use [LF]   0223 Patient will require admission for hypoxia and pneumonia [LF]      ED Course User Index  [LF] Jake Monson MD                 Clinical Impression:   Final diagnoses:  [R09.02] Hypoxia  [Z87.891] Ex-smoker  [J18.9] Pneumonia of right lower lobe due to infectious organism  [R07.9] Acute chest pain  [R21] Rash      ED Disposition Condition    Admit Stable                Jake Monson MD  09/15/22 0259

## 2022-09-16 PROCEDURE — 27000221 HC OXYGEN, UP TO 24 HOURS

## 2022-09-16 PROCEDURE — 25000003 PHARM REV CODE 250: Performed by: INTERNAL MEDICINE

## 2022-09-16 PROCEDURE — 94761 N-INVAS EAR/PLS OXIMETRY MLT: CPT

## 2022-09-16 PROCEDURE — 25000003 PHARM REV CODE 250: Performed by: HOSPITALIST

## 2022-09-16 PROCEDURE — 63600175 PHARM REV CODE 636 W HCPCS: Performed by: INTERNAL MEDICINE

## 2022-09-16 PROCEDURE — 99900035 HC TECH TIME PER 15 MIN (STAT)

## 2022-09-16 PROCEDURE — 97166 OT EVAL MOD COMPLEX 45 MIN: CPT

## 2022-09-16 PROCEDURE — 97161 PT EVAL LOW COMPLEX 20 MIN: CPT

## 2022-09-16 PROCEDURE — 11000001 HC ACUTE MED/SURG PRIVATE ROOM

## 2022-09-16 RX ORDER — PRAZOSIN HYDROCHLORIDE 2 MG/1
2 CAPSULE ORAL 2 TIMES DAILY
Status: DISCONTINUED | OUTPATIENT
Start: 2022-09-16 | End: 2022-09-17 | Stop reason: HOSPADM

## 2022-09-16 RX ORDER — AMOXICILLIN AND CLAVULANATE POTASSIUM 875; 125 MG/1; MG/1
1 TABLET, FILM COATED ORAL EVERY 12 HOURS
Status: DISCONTINUED | OUTPATIENT
Start: 2022-09-16 | End: 2022-09-17 | Stop reason: HOSPADM

## 2022-09-16 RX ORDER — HYDROCODONE BITARTRATE AND ACETAMINOPHEN 5; 325 MG/1; MG/1
1 TABLET ORAL EVERY 4 HOURS PRN
Status: DISCONTINUED | OUTPATIENT
Start: 2022-09-16 | End: 2022-09-17 | Stop reason: HOSPADM

## 2022-09-16 RX ORDER — ISOSORBIDE DINITRATE 5 MG/1
5 TABLET ORAL DAILY
Status: DISCONTINUED | OUTPATIENT
Start: 2022-09-16 | End: 2022-09-17 | Stop reason: HOSPADM

## 2022-09-16 RX ORDER — AMLODIPINE BESYLATE 2.5 MG/1
2.5 TABLET ORAL DAILY
Status: DISCONTINUED | OUTPATIENT
Start: 2022-09-16 | End: 2022-09-17

## 2022-09-16 RX ORDER — ATORVASTATIN CALCIUM 10 MG/1
10 TABLET, FILM COATED ORAL DAILY
Status: DISCONTINUED | OUTPATIENT
Start: 2022-09-16 | End: 2022-09-17 | Stop reason: HOSPADM

## 2022-09-16 RX ADMIN — HYDROCODONE BITARTRATE AND ACETAMINOPHEN 1 TABLET: 5; 325 TABLET ORAL at 04:09

## 2022-09-16 RX ADMIN — AMOXICILLIN AND CLAVULANATE POTASSIUM 1 TABLET: 875; 125 TABLET, FILM COATED ORAL at 09:09

## 2022-09-16 RX ADMIN — AMLODIPINE BESYLATE 2.5 MG: 5 TABLET ORAL at 10:09

## 2022-09-16 RX ADMIN — METHOCARBAMOL 750 MG: 750 TABLET ORAL at 02:09

## 2022-09-16 RX ADMIN — MELATONIN TAB 3 MG 6 MG: 3 TAB at 08:09

## 2022-09-16 RX ADMIN — FAMOTIDINE 20 MG: 20 TABLET, FILM COATED ORAL at 09:09

## 2022-09-16 RX ADMIN — AMOXICILLIN AND CLAVULANATE POTASSIUM 1 TABLET: 875; 125 TABLET, FILM COATED ORAL at 08:09

## 2022-09-16 RX ADMIN — METHOCARBAMOL 750 MG: 750 TABLET ORAL at 08:09

## 2022-09-16 RX ADMIN — ACETAMINOPHEN AND CODEINE PHOSPHATE 1 TABLET: 300; 30 TABLET ORAL at 12:09

## 2022-09-16 RX ADMIN — HYDROCODONE BITARTRATE AND ACETAMINOPHEN 1 TABLET: 5; 325 TABLET ORAL at 11:09

## 2022-09-16 RX ADMIN — HYDROCODONE BITARTRATE AND ACETAMINOPHEN 1 TABLET: 5; 325 TABLET ORAL at 03:09

## 2022-09-16 RX ADMIN — FAMOTIDINE 20 MG: 20 TABLET, FILM COATED ORAL at 08:09

## 2022-09-16 RX ADMIN — ONDANSETRON 4 MG: 2 INJECTION INTRAMUSCULAR; INTRAVENOUS at 04:09

## 2022-09-16 RX ADMIN — HYDROCODONE BITARTRATE AND ACETAMINOPHEN 1 TABLET: 5; 325 TABLET ORAL at 09:09

## 2022-09-16 RX ADMIN — ONDANSETRON 4 MG: 2 INJECTION INTRAMUSCULAR; INTRAVENOUS at 09:09

## 2022-09-16 RX ADMIN — HYDROCODONE BITARTRATE AND ACETAMINOPHEN 1 TABLET: 5; 325 TABLET ORAL at 07:09

## 2022-09-16 NOTE — PT/OT/SLP EVAL
Physical Therapy Evaluation and Discharge Note    Patient Name:  Dallin Heath   MRN:  [id number]    Recommendations:     Discharge Recommendations:  home   Discharge Equipment Recommendations: none   Barriers to discharge:  None    Assessment:     Dallin Heath is a 67 y.o. male admitted with a medical diagnosis of CAP (community acquired pneumonia), syncope/fall. At this time, patient is functioning at their prior level of function and does not require further acute PT services. Patient tolerated PT evaluation well, mobilizing independently around room. Patient reporting pain in L side from fall, improved with RW use for mobility. Patient denies need for acute PT services, PT in agreement. PT to sign off, encouraged patient to use IS and utilize RW for mobility.     Recent Surgery: * No surgery found *      Plan:     During this hospitalization, patient does not require further acute PT services.  Please re-consult if situation changes.      Subjective     Chief Complaint: none stated  Patient/Family Comments/goals: to go home  Pain/Comfort:  Pain Rating 1:  (patient with c/o pain in L ribs, improved with RW use)    Patients cultural, spiritual, Orthodox conflicts given the current situation: no    Living Environment:  Pt lives in [place] with wife.  Prior to admission, patients level of function was independent.  Equipment used at home: none.  DME owned (not currently used): rolling walker.  Upon discharge, patient will have assistance from wife.    Objective:     Communicated with RN prior to session.  Patient found sitting edge of bed with peripheral IV, telemetry, pulse ox (continuous) upon PT entry to room.    General Precautions: Standard,     Orthopedic Precautions:N/A   Braces: N/A   Respiratory Status: Room air    Exams:  RLE ROM: WNL  RLE Strength: WNL  LLE ROM: WNL  LLE Strength: WNL    Functional Mobility:  Bed Mobility:  Supine to Sit: modified independence  Transfers:  Sit to Stand:  modified independence  with rolling walker  Gait: Pt ambulated 50ft with RW Jeff, no gait deficits or LOB.     AM-PAC 6 CLICK MOBILITY  Total Score:24      Patient left sitting edge of bed with all lines intact, call button in reach, and family present.    GOALS:   Multidisciplinary Problems       Physical Therapy Goals       Not on file                    History:     Past Medical History:   Diagnosis Date    HLD (hyperlipidemia)     Hypertension        No past surgical history on file.    Time Tracking:     PT Received On: 09/16/22  PT Start Time: 0850     PT Stop Time: 0905  PT Total Time (min): 15 min     Billable Minutes: Evaluation Low complexity, 15min      09/16/2022

## 2022-09-16 NOTE — PROGRESS NOTES
[facility] Medicine Progress Note        Chief Complaint: Inpatient Follow-up for pneumonia/syncope     HPI:   67 year old male who presented to the Ed with c/o sob, syncope at home tonight.  He states that he was seen by his cardiologist today for a routine checkup and had an US which the patient states he was told he was fine and was prescribed prazosin due to urinary difficulty.  He states he took the medication this evening along with a sleep aid and back pain medication. He states he got up around 10 p.m. to urinate and had a syncopal episode.  Wife states that patient has been having back pain x several weeks and not moving around much. Today's Ed lab work revealed K+ 3.4, Cr 1.2, lactate 2.7. Troponin, BNP wnl. Flu/Covid negative. CTA negative for PE. Showed basal atelectatic bands with patchy elements at posterior right lung base.  He was given azithromycin and rocephin in the ED. He was admitted to hospital medicine for management. Echo cardiogram was normal.     Interval Hx:  Lactic acid has trended down.  Now within normal limits.  He states that he is feeling much better.  Wife is at the bedside.  Currently on 1 L supplemental O2 with good oxygenation.  Can likely be weaned to room air today.  Discussed switching him over to oral antibiotics.  Like to get him up with some therapy today.  If he continues to do well can potentially go home tomorrow    Objective/physical exam:  General: In no acute distress, afebrile  Chest: Clear to auscultation bilaterally  Heart: RRR, +S1, S2, no appreciable murmur  Abdomen: Soft, nontender, BS +  MSK: Warm, no lower extremity edema, no clubbing or cyanosis  Neurologic: Alert and oriented x4, Cranial nerve II-XII intact, Strength 5/5 in all 4 extremities    VITAL SIGNS: 24 HRS MIN & MAX LAST   Temp  Min: 97.6 °F (36.4 °C)  Max: 98.7 °F (37.1 °C) 97.9 °F (36.6 °C)   BP  Min: 112/72  Max: 143/87 139/83   Pulse  Min: 86  Max: 112  87    Resp  Min: 14  Max: 20 18   SpO2  Min: 90 %  Max: 97 % (!) 94 %         Recent Labs   Lab 09/14/22  2337 09/15/22  0404   WBC 8.4 10.0   RBC 4.72 4.64*   HGB 14.7 14.4   HCT 44.7 44.4   MCV 94.7* 95.7*   MCH 31.1* 31.0   MCHC 32.9* 32.4*   RDW 12.9 13.0    257   MPV 11.2* 10.9*       Recent Labs   Lab 09/14/22  2337 09/15/22  0404    136   K 3.4* 4.1   CO2 28 23   BUN 20.9 21.5   CREATININE 1.20* 1.24*   CALCIUM 9.7 9.3   MG  --  1.90   ALBUMIN 3.9  --    ALKPHOS 72  --    ALT 21  --    AST 21  --    BILITOT 0.7  --           Microbiology Results (last 7 days)       ** No results found for the last 168 hours. **             See below for Radiology    Scheduled Med:   azithromycin  500 mg Oral QHS    cefTRIAXone (ROCEPHIN) IVPB  1 g Intravenous Q24H    enoxaparin  40 mg Subcutaneous Daily    famotidine  20 mg Oral BID        Continuous Infusions:   sodium bicarbonate drip 125 mL/hr at 09/15/22 1646        PRN Meds:  acetaminophen, acetaminophen, acetaminophen-codeine 300-30mg, albuterol sulfate, aluminum-magnesium hydroxide-simethicone, benzonatate, dextromethorphan-guaiFENesin  mg/5 ml, HYDROcodone-acetaminophen, influenza, melatonin, methocarbamoL, ondansetron, polyethylene glycol, prochlorperazine, senna-docusate 8.6-50 mg, simethicone, sodium chloride 0.9%       Assessment/Plan:   Syncope likely micturition  Community acquired pneumonia vs atelectasis  Acute kidney injury  Lactic acidosis  Hypokalemia     Lactic acidosis has resolved.  No leukocytosis.  No fevers.  He is doing much better overall.    Will change his antibiotic to oral Augmentin in DC IV.    PT OT to evaluate this morning.    DC IV fluids.  Carotid ultrasound still pending but echo normal.    Discussed that he could potentially go home tomorrow if off of supplemental O2 and ambulating.  He is followed by Dr. Wade as an outpatient for BPH.  Suspect that some of his presyncope secondary to medications.  Recommend that he follow  up with him next week. Also followed by Dr. Morales in cardiology clinic  Will resume home BP meds and see if hypotension could be playing a role as well. Hold HCTZ/BB    Patient condition:  Stable    Anticipated discharge and Disposition: TBD      All diagnosis and differential diagnosis have been reviewed; assessment and plan has been documented; I have personally reviewed the labs and test results that are presently available; I have reviewed the patients medication list; I have reviewed the consulting providers response and recommendations. I have reviewed or attempted to review medical records based upon their availability    All of the patient's questions have been  addressed and answered. Patient's is agreeable to the above stated plan. I will continue to monitor closely and make adjustments to medical management as needed.  _____________________________________________________________________      Radiology:  Echo  · Patient just had full echocardiogramn at Dr. Morales office last week.   Limited study performed to assess function.  · Normal systolic function.  · The estimated ejection fraction is 60%.  · Normal central venous pressure (3 mmHg).     CTA Chest Non-Coronary(PE Studies)  Narrative: EXAMINATION:  CTA CHEST NON CORONARY (PE STUDIES)    CLINICAL HISTORY:  Pulmonary embolism (PE) suspected, high prob;    TECHNIQUE:  Axial images of the chest were obtained with contrast. Sagittal and coronal reconstructed images were available for review. 3-D MIP reconstructions were performed from source imaging.    Automatic dose control was utilized to reduce patient radiation dose.    DLP= 464    COMPARISON:  No prior imaging available for comparison.    FINDINGS:  Soft Tissues: Unremarkable.    Lines and Tubes: None.    Axilla: A few prominent lymph nodes are seen in the axilla.    Neck: The visualized soft tissues of the neck appear unremarkable. The thyroid gland appear unremarkable.    Mediastinum: The  mediastinal structures are within normal limits.    Heart: The heart appears unremarkable.    Aorta: Moderate aortic calcification is seen in the thoracic aorta. There is right sided aortic arch seen.    Pulmonary Arteries: No filling defects are seen in the pulmonary arteries to suggest pulmonary embolus.    Lungs: There is pronounced non specific dependent change at the lung bases. Basal atelectatic bands. Patchy elements at the posterior right lung base. A superimposed developing pneumonia here is not excluded.    Pleura: No effusions or pneumothorax are identified.    Bony Structures:    Spine: Moderate spondylolytic changes are seen in the thoracic spine. Schmorl's node is seen in the inferior endplate of D12 vertebral body.    Ribs: The ribs appear unremarkable.    Abdomen: There is a well defined hypodense lesion with peripheral vascularity seen in segment IV B of the liver on series 8 image 312. The findings may be suggestive of a haemangioma.  Impression: Impression:    1. No filling defects are seen in the pulmonary arteries to suggest pulmonary embolus.    2. Basal atelectatic bands. Patchy elements at the posterior right lung base. A superimposed developing pneumonia here is not excluded.    3. Details and other findings as discussed above.    Electronically signed by: Fito Campos  Date:    09/15/2022  Time:    09:00  X-Ray Chest AP Portable  Narrative: EXAMINATION:  XR CHEST AP PORTABLE    CLINICAL HISTORY:  sob;    COMPARISON:  10/05/2019    FINDINGS:  Single view of the chest shows small left pleural fluid with adjacent linear ground-glass opacity which may reflect atelectasis.  No lobar type consolidation or pneumothorax.  Heart is upper normal in size.  Impression: Small left effusion with adjacent atelectasis    Electronically signed by: Ronnie Sullivan MD  Date:    09/15/2022  Time:    07:48      Adan Zendejas MD   09/16/2022

## 2022-09-16 NOTE — PT/OT/SLP EVAL
Occupational Therapy   Evaluation and Discharge Note    Name: Dallin Heath  MRN: [id number]  Admitting Diagnosis:  CAP (community acquired pneumonia)   Recent Surgery: * No surgery found *      Recommendations:     Discharge Recommendations: home with home health, home  Discharge Equipment Recommendations:     Barriers to discharge:       Assessment:     Dallin Heath is a 67 y.o. male with a medical diagnosis of CAP (community acquired pneumonia). At this time, patient is functioning at their prior level of function and does not require further acute OT services.     Plan:     During this hospitalization, patient does not require further acute OT services.  Please re-consult if situation changes.    Plan of Care Reviewed with: patient, spouse    Subjective     Chief Complaint: Pain in ribs on L side  Patient/Family Comments/goals: Pt reports he was attending [facility] in [place], [place] for lower back prior to injury.     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     Pain/Comfort:  Pain Rating 1: 10/10  Location - Side 1: Left  Location 1: rib(s)  Pain Addressed 1: Cessation of Activity, Distraction, Reposition    Patients cultural, spiritual, Jew conflicts given the current situation: no    Objective:     Communicated with: Nshumza prior to session. Pt found sitting on bedside commode that was placed over there toilet in bathroom with peripheral IV.     General Precautions: Standard, fall   Orthopedic Precautions:    Braces:    Respiratory Status: Room air     Occupational Performance:    Bed Mobility:    Pt was able tolerate EOB  sitting with Mod I     Functional Mobility/Transfers:  Patient completed Sit <> Stand Transfer with stand by assistance  with  rolling walker @ EOB  Pt able to perform Sit <> Stand transfer from bedside commode on toilet with Mod I with bathroom rails.  Functional Mobility: Pt reports that the pain in L side and current condition is impacting the amount of assistance needed for functional mobility. Pt able to ambulate home distance in his room with RW with SBA. Pt reports using RW helped with pain.     Activities of Daily Living:  Pt required Total A for donning and doffing socks @ EOB 2/2 increased pain on L side.     Cognitive/Visual Perceptual:  Intact    Physical Exam:  B UE    AMPAC 6 Click ADL:  AMPAC Total Score:      Treatment & Education:  Pt and spouse educated on energy conservation techniques while performing ADLs and the importance of out of bed mobility as tolerated to prevent deficits associated with immobility such as stiffness and pain.     Patient left sitting edge of bed with all lines intact, call button in reach, and wife present    GOALS:   Multidisciplinary Problems       Occupational Therapy Goals       Not on file                    History:     Past Medical History:   Diagnosis Date    HLD (hyperlipidemia)     Hypertension        No past surgical history on file.    Time Tracking:     OT Date of Treatment:    OT Start Time: 0844  OT Stop Time: 0903  OT Total Time (min): 19 min    Billable Minutes:Evaluation Mod Complexity - 19 min    9/16/2022

## 2022-09-17 VITALS
HEART RATE: 89 BPM | OXYGEN SATURATION: 90 % | TEMPERATURE: 98 F | BODY MASS INDEX: 34.86 KG/M2 | SYSTOLIC BLOOD PRESSURE: 170 MMHG | WEIGHT: 230 LBS | DIASTOLIC BLOOD PRESSURE: 91 MMHG | HEIGHT: 68 IN | RESPIRATION RATE: 18 BRPM

## 2022-09-17 LAB
ANION GAP SERPL CALC-SCNC: 13 MEQ/L
BASOPHILS # BLD AUTO: 0.03 X10(3)/MCL (ref 0–0.2)
BASOPHILS NFR BLD AUTO: 0.4 %
BUN SERPL-MCNC: 14.8 MG/DL (ref 8.4–25.7)
CALCIUM SERPL-MCNC: 9.1 MG/DL (ref 8.8–10)
CHLORIDE SERPL-SCNC: 101 MMOL/L (ref 98–107)
CO2 SERPL-SCNC: 24 MMOL/L (ref 23–31)
CREAT SERPL-MCNC: 0.78 MG/DL (ref 0.73–1.18)
CREAT/UREA NIT SERPL: 19
EOSINOPHIL # BLD AUTO: 0.22 X10(3)/MCL (ref 0–0.9)
EOSINOPHIL NFR BLD AUTO: 2.7 %
ERYTHROCYTE [DISTWIDTH] IN BLOOD BY AUTOMATED COUNT: 13.2 % (ref 11.5–17)
GFR SERPLBLD CREATININE-BSD FMLA CKD-EPI: >60 MLS/MIN/1.73/M2
GLUCOSE SERPL-MCNC: 90 MG/DL (ref 82–115)
HCT VFR BLD AUTO: 41.8 % (ref 42–52)
HGB BLD-MCNC: 13.6 GM/DL (ref 14–18)
IMM GRANULOCYTES # BLD AUTO: 0.03 X10(3)/MCL (ref 0–0.04)
IMM GRANULOCYTES NFR BLD AUTO: 0.4 %
LYMPHOCYTES # BLD AUTO: 1.33 X10(3)/MCL (ref 0.6–4.6)
LYMPHOCYTES NFR BLD AUTO: 16 %
MCH RBC QN AUTO: 30.7 PG (ref 27–31)
MCHC RBC AUTO-ENTMCNC: 32.5 MG/DL (ref 33–36)
MCV RBC AUTO: 94.4 FL (ref 80–94)
MONOCYTES # BLD AUTO: 0.73 X10(3)/MCL (ref 0.1–1.3)
MONOCYTES NFR BLD AUTO: 8.8 %
NEUTROPHILS # BLD AUTO: 6 X10(3)/MCL (ref 2.1–9.2)
NEUTROPHILS NFR BLD AUTO: 71.7 %
NRBC BLD AUTO-RTO: 0 %
PLATELET # BLD AUTO: 268 X10(3)/MCL (ref 130–400)
PMV BLD AUTO: 11.1 FL (ref 7.4–10.4)
POTASSIUM SERPL-SCNC: 4.6 MMOL/L (ref 3.5–5.1)
RBC # BLD AUTO: 4.43 X10(6)/MCL (ref 4.7–6.1)
SODIUM SERPL-SCNC: 138 MMOL/L (ref 136–145)
WBC # SPEC AUTO: 8.3 X10(3)/MCL (ref 4.5–11.5)

## 2022-09-17 PROCEDURE — 80048 BASIC METABOLIC PNL TOTAL CA: CPT | Performed by: HOSPITALIST

## 2022-09-17 PROCEDURE — 90694 VACC AIIV4 NO PRSRV 0.5ML IM: CPT | Performed by: INTERNAL MEDICINE

## 2022-09-17 PROCEDURE — G0008 ADMIN INFLUENZA VIRUS VAC: HCPCS | Performed by: INTERNAL MEDICINE

## 2022-09-17 PROCEDURE — 25000003 PHARM REV CODE 250: Performed by: INTERNAL MEDICINE

## 2022-09-17 PROCEDURE — 85025 COMPLETE CBC W/AUTO DIFF WBC: CPT | Performed by: HOSPITALIST

## 2022-09-17 PROCEDURE — 90471 IMMUNIZATION ADMIN: CPT | Performed by: INTERNAL MEDICINE

## 2022-09-17 PROCEDURE — 25000003 PHARM REV CODE 250: Performed by: HOSPITALIST

## 2022-09-17 PROCEDURE — 63600175 PHARM REV CODE 636 W HCPCS: Performed by: INTERNAL MEDICINE

## 2022-09-17 PROCEDURE — 36415 COLL VENOUS BLD VENIPUNCTURE: CPT | Performed by: HOSPITALIST

## 2022-09-17 RX ORDER — AMLODIPINE BESYLATE 5 MG/1
10 TABLET ORAL DAILY
Status: DISCONTINUED | OUTPATIENT
Start: 2022-09-17 | End: 2022-09-17 | Stop reason: HOSPADM

## 2022-09-17 RX ORDER — GUAIFENESIN/DEXTROMETHORPHAN 100-10MG/5
5 SYRUP ORAL EVERY 6 HOURS PRN
Qty: 118 ML | Refills: 0 | Status: SHIPPED | OUTPATIENT
Start: 2022-09-17 | End: 2022-09-22

## 2022-09-17 RX ORDER — HYDROCODONE BITARTRATE AND ACETAMINOPHEN 5; 325 MG/1; MG/1
1 TABLET ORAL EVERY 6 HOURS PRN
Qty: 20 TABLET | Refills: 0 | Status: SHIPPED | OUTPATIENT
Start: 2022-09-17 | End: 2022-09-22

## 2022-09-17 RX ORDER — AMOXICILLIN AND CLAVULANATE POTASSIUM 875; 125 MG/1; MG/1
1 TABLET, FILM COATED ORAL EVERY 12 HOURS
Qty: 12 TABLET | Refills: 0 | Status: SHIPPED | OUTPATIENT
Start: 2022-09-17 | End: 2022-09-23

## 2022-09-17 RX ADMIN — FAMOTIDINE 20 MG: 20 TABLET, FILM COATED ORAL at 09:09

## 2022-09-17 RX ADMIN — INFLUENZA A VIRUS A/VICTORIA/2570/2019 IVR-215 (H1N1) ANTIGEN (FORMALDEHYDE INACTIVATED), INFLUENZA A VIRUS A/DARWIN/6/2021 IVR-227 (H3N2) ANTIGEN (FORMALDEHYDE INACTIVATED), INFLUENZA B VIRUS B/AUSTRIA/1359417/2021 BVR-26 ANTIGEN (FORMALDEHYDE INACTIVATED), INFLUENZA B VIRUS B/PHUKET/3073/2013 BVR-1B ANTIGEN (FORMALDEHYDE INACTIVATED) 0.5 ML: 15; 15; 15; 15 INJECTION, SUSPENSION INTRAMUSCULAR at 10:09

## 2022-09-17 RX ADMIN — HYDROCODONE BITARTRATE AND ACETAMINOPHEN 1 TABLET: 5; 325 TABLET ORAL at 04:09

## 2022-09-17 RX ADMIN — HYDROCODONE BITARTRATE AND ACETAMINOPHEN 1 TABLET: 5; 325 TABLET ORAL at 09:09

## 2022-09-17 RX ADMIN — AMOXICILLIN AND CLAVULANATE POTASSIUM 1 TABLET: 875; 125 TABLET, FILM COATED ORAL at 09:09

## 2022-09-17 NOTE — DISCHARGE SUMMARY
[facility] Medicine Discharge Summary    Admit Date: 9/14/2022  Discharge Date and Time: 9/17/202210:48 AM  Admitting Physician:  Team  Discharging Physician: Javy Robins MD.  Primary Care Physician: Primary Doctor No  Consults: None    Discharge Diagnoses:  Syncope likely micturition  Community acquired pneumonia vs atelectasis  Acute kidney injury  Lactic acidosis  Hypokalemia    Hospital Course:   Chief Complaint: Inpatient Follow-up for pneumonia/syncope             HPI:   67 year old male who presented to the Ed with c/o sob, syncope at home tonight.  He states that he was seen by his cardiologist today for a routine checkup and had an US which the patient states he was told he was fine and was prescribed prazosin due to urinary difficulty.  He states he took the medication this evening along with a sleep aid and back pain medication. He states he got up around 10 p.m. to urinate and had a syncopal episode.  Wife states that patient has been having back pain x several weeks and not moving around much. Today's Ed lab work revealed K+ 3.4, Cr 1.2, lactate 2.7. Troponin, BNP wnl. Flu/Covid negative. CTA negative for PE. Showed basal atelectatic bands with patchy elements at posterior right lung base.  He was given azithromycin and rocephin in the ED. He was admitted to hospital medicine for management. Echo cardiogram was normal.      Interval Hx:  Lactic acid has trended down.  Now within normal limits.  He states that he is feeling much better.  Wife is at the bedside.  Currently on 1 L supplemental O2 with good oxygenation.  Can likely be weaned to room air today.  Discussed switching him over to oral antibiotics.  Like to get him up with some therapy today.  If he continues to do well can potentially go home tomorrow     Objective/physical exam:  General: In no acute distress, afebrile  Chest: Clear to auscultation bilaterally  Heart: RRR, +S1, S2, no appreciable  murmur  Abdomen: Soft, nontender, BS +  MSK: Warm, no lower extremity edema, no clubbing or cyanosis  Neurologic: Alert and oriented x4, Cranial nerve II-XII intact, Strength 5/5 in all 4 extremities    summary  Patient was admitted due to syncopal episode likely due to micturition.  He was found to have community-acquired pneumonia and was placed on antibiotics Augmentin on discharge to complete a 7 day course.  He initially had elevated creatinine and lactic acidosis.  He was given IV fluids and these resolved.  His carotid ultrasound and echo reviewed when normal.  His blood pressure was found to be elevated and home meds were adjusted.  Patient will follow-up with his cardiologist and urologist on outpatient.    He was prescribed Norco 5 mg q.6 hourly as needed for pain with 20 pills.    Pt was seen and examined on the day of discharge  Vitals:  VITAL SIGNS: 24 HRS MIN & MAX LAST   Temp  Min: 97.7 °F (36.5 °C)  Max: 98.3 °F (36.8 °C) 97.7 °F (36.5 °C)   BP  Min: 119/81  Max: 170/91 (!) 170/91     Pulse  Min: 84  Max: 100  89   Resp  Min: 16  Max: 18 18   SpO2  Min: 90 %  Max: 96 % (!) 90 %         Procedures Performed: No admission procedures for hospital encounter.     Significant Diagnostic Studies: See Full reports for all details    Recent Labs   Lab 09/14/22  2337 09/15/22  0404 09/17/22  0733   WBC 8.4 10.0 8.3   RBC 4.72 4.64* 4.43*   HGB 14.7 14.4 13.6*   HCT 44.7 44.4 41.8*   MCV 94.7* 95.7* 94.4*   MCH 31.1* 31.0 30.7   MCHC 32.9* 32.4* 32.5*   RDW 12.9 13.0 13.2    257 268   MPV 11.2* 10.9* 11.1*       Recent Labs   Lab 09/14/22  2337 09/15/22  0404 09/17/22  0427    136 138   K 3.4* 4.1 4.6   CO2 28 23 24   BUN 20.9 21.5 14.8   CREATININE 1.20* 1.24* 0.78   CALCIUM 9.7 9.3 9.1   MG  --  1.90  --    ALBUMIN 3.9  --   --    ALKPHOS 72  --   --    ALT 21  --   --    AST 21  --   --    BILITOT 0.7  --   --         Microbiology Results (last 7 days)       ** No results found for the last 168  hours. **             Echo  · Patient just had full echocardiogramn at Dr. Morales office last week.   Limited study performed to assess function.  · Normal systolic function.  · The estimated ejection fraction is 60%.  · Normal central venous pressure (3 mmHg).     CTA Chest Non-Coronary(PE Studies)  Narrative: EXAMINATION:  CTA CHEST NON CORONARY (PE STUDIES)    CLINICAL HISTORY:  Pulmonary embolism (PE) suspected, high prob;    TECHNIQUE:  Axial images of the chest were obtained with contrast. Sagittal and coronal reconstructed images were available for review. 3-D MIP reconstructions were performed from source imaging.    Automatic dose control was utilized to reduce patient radiation dose.    DLP= 464    COMPARISON:  No prior imaging available for comparison.    FINDINGS:  Soft Tissues: Unremarkable.    Lines and Tubes: None.    Axilla: A few prominent lymph nodes are seen in the axilla.    Neck: The visualized soft tissues of the neck appear unremarkable. The thyroid gland appear unremarkable.    Mediastinum: The mediastinal structures are within normal limits.    Heart: The heart appears unremarkable.    Aorta: Moderate aortic calcification is seen in the thoracic aorta. There is right sided aortic arch seen.    Pulmonary Arteries: No filling defects are seen in the pulmonary arteries to suggest pulmonary embolus.    Lungs: There is pronounced non specific dependent change at the lung bases. Basal atelectatic bands. Patchy elements at the posterior right lung base. A superimposed developing pneumonia here is not excluded.    Pleura: No effusions or pneumothorax are identified.    Bony Structures:    Spine: Moderate spondylolytic changes are seen in the thoracic spine. Schmorl's node is seen in the inferior endplate of D12 vertebral body.    Ribs: The ribs appear unremarkable.    Abdomen: There is a well defined hypodense lesion with peripheral vascularity seen in segment IV B of the liver on series 8 image 312.  The findings may be suggestive of a haemangioma.  Impression: Impression:    1. No filling defects are seen in the pulmonary arteries to suggest pulmonary embolus.    2. Basal atelectatic bands. Patchy elements at the posterior right lung base. A superimposed developing pneumonia here is not excluded.    3. Details and other findings as discussed above.    Electronically signed by: Fito Campos  Date:    09/15/2022  Time:    09:00  X-Ray Chest AP Portable  Narrative: EXAMINATION:  XR CHEST AP PORTABLE    CLINICAL HISTORY:  sob;    COMPARISON:  10/05/2019    FINDINGS:  Single view of the chest shows small left pleural fluid with adjacent linear ground-glass opacity which may reflect atelectasis.  No lobar type consolidation or pneumothorax.  Heart is upper normal in size.  Impression: Small left effusion with adjacent atelectasis    Electronically signed by: Ronnie Sullivan MD  Date:    09/15/2022  Time:    07:48         Medication List        START taking these medications      amoxicillin-clavulanate 875-125mg 875-125 mg per tablet  Commonly known as: AUGMENTIN  Take 1 tablet by mouth every 12 (twelve) hours. for 6 days     dextromethorphan-guaiFENesin  mg/5 ml  mg/5 mL liquid  Commonly known as: ROBITUSSIN-DM  Take 5 mLs by mouth every 6 (six) hours as needed.     HYDROcodone-acetaminophen 5-325 mg per tablet  Commonly known as: NORCO  Take 1 tablet by mouth every 6 (six) hours as needed for Pain.            CONTINUE taking these medications      acetaminophen-codeine 300-30mg 300-30 mg Tab  Commonly known as: TYLENOL #3     amLODIPine 2.5 MG tablet  Commonly known as: NORVASC     atorvastatin 10 MG tablet  Commonly known as: LIPITOR     bisoprolol-hydrochlorothiazide 5-6.25 mg 5-6.25 mg Tab  Commonly known as: ZIAC     clotrimazole-betamethasone 1-0.05% cream  Commonly known as: LOTRISONE     isosorbide dinitrate 5 MG Tab  Commonly known as: ISORDIL     prazosin 2 MG Cap  Commonly known as:  MINIPRESS            STOP taking these medications      diclofenac 75 MG EC tablet  Commonly known as: VOLTAREN     hydroCHLOROthiazide 25 MG tablet  Commonly known as: HYDRODIURIL               Where to Get Your Medications        These medications were sent to [facility]  Pharmacy - [place] - [address]  [address] [place] [address]      Phone: [phone]   amoxicillin-clavulanate 875-125mg 875-125 mg per tablet  dextromethorphan-guaiFENesin  mg/5 ml  mg/5 mL liquid  HYDROcodone-acetaminophen 5-325 mg per tablet          Explained in detail to the patient about the discharge plan, medications, and follow-up visits. Pt understands and agrees with the treatment plan  Discharge Disposition: Left Without Being Seen   Discharged Condition: stable  Diet-   Dietary Orders (From admission, onward)       Start     Ordered    09/15/22 0325  Diet heart healthy  (Diet/Nutrition [facility])  Diet effective now         09/15/22 0324                   Medications Per DC med rec  Activities as tolerated   Follow-up Information       Primary Doctor No. Schedule an appointment as soon as possible for a visit in 1 week(s).    Why: needs f/up appt with a pcp , with his cardiologist, and his urologist all within 1-2 weeks             Jam Morales MD Follow up.    Specialty: Cardiology  Why: 1-2 weeks follow up post hospital visit  Contact information:  [address]  [facility] [address], [address]  [facility] [address] [phone]               Mazin Serna II, MD Follow up.    Specialty: Family Medicine  Why: 1-2 week follow up post hospital  Contact information:  [address] [facility] [address] [phone]                           For further questions contact hospitalist office    Discharge time 33 minutes    For worsening symptoms, chest pain, shortness of breath, increased abdominal pain, high grade fever, stroke or stroke like symptoms, immediately go to the nearest Emergency Room or  call 911 as soon as possible.      Javy Wang M.D on 9/17/2022. at 10:48 AM.

## 2022-09-17 NOTE — PLAN OF CARE
Problem: Adult Inpatient Plan of Care  Goal: Plan of Care Review  9/16/2022 1957 by Chela Salazar RN  Outcome: Ongoing, Progressing  9/16/2022 1957 by Chela Salazar RN  Outcome: Ongoing, Progressing  Goal: Patient-Specific Goal (Individualized)  9/16/2022 1957 by Chela Salazar RN  Outcome: Ongoing, Progressing  9/16/2022 1957 by Chela Salazar RN  Outcome: Ongoing, Progressing  Goal: Absence of Hospital-Acquired Illness or Injury  Outcome: Ongoing, Progressing  Goal: Optimal Comfort and Wellbeing  9/16/2022 1957 by Chela Salazar RN  Outcome: Ongoing, Progressing  9/16/2022 1957 by Chela Salazar RN  Outcome: Ongoing, Progressing  Goal: Readiness for Transition of Care  Outcome: Ongoing, Progressing     Problem: Fluid Imbalance (Pneumonia)  Goal: Fluid Balance  Outcome: Ongoing, Progressing

## 2022-09-17 NOTE — PLAN OF CARE
Problem: Adult Inpatient Plan of Care  Goal: Plan of Care Review  Outcome: Ongoing, Progressing  Goal: Patient-Specific Goal (Individualized)  Outcome: Ongoing, Progressing  Goal: Absence of Hospital-Acquired Illness or Injury  Outcome: Ongoing, Progressing  Intervention: Identify and Manage Fall Risk  Flowsheets (Taken 9/17/2022 0058)  Safety Promotion/Fall Prevention:   assistive device/personal item within reach   side rails raised x 2   nonskid shoes/socks when out of bed  Intervention: Prevent Skin Injury  Flowsheets (Taken 9/17/2022 0058)  Body Position: position changed independently  Intervention: Prevent and Manage VTE (Venous Thromboembolism) Risk  Flowsheets (Taken 9/17/2022 0058)  Activity Management: Ambulated to bathroom - L4  Intervention: Prevent Infection  Flowsheets (Taken 9/17/2022 0058)  Infection Prevention: rest/sleep promoted  Goal: Optimal Comfort and Wellbeing  Outcome: Ongoing, Progressing  Intervention: Monitor Pain and Promote Comfort  Flowsheets (Taken 9/17/2022 0058)  Pain Management Interventions: massage provided  Intervention: Provide Person-Centered Care  Flowsheets (Taken 9/17/2022 0058)  Trust Relationship/Rapport: thoughts/feelings acknowledged  Goal: Readiness for Transition of Care  Outcome: Ongoing, Progressing  Intervention: Mutually Develop Transition Plan  Flowsheets (Taken 9/17/2022 0058)  Equipment Currently Used at Home: none     Problem: Fluid Imbalance (Pneumonia)  Goal: Fluid Balance  Outcome: Ongoing, Progressing     Problem: Infection (Pneumonia)  Goal: Resolution of Infection Signs and Symptoms  Outcome: Ongoing, Progressing  Intervention: Prevent Infection Progression  Flowsheets (Taken 9/17/2022 0058)  Isolation Precautions: precautions initiated     Problem: Respiratory Compromise (Pneumonia)  Goal: Effective Oxygenation and Ventilation  Outcome: Ongoing, Progressing  Intervention: Promote Airway Secretion Clearance  Flowsheets (Taken 9/17/2022  0058)  Breathing Techniques/Airway Clearance: deep/controlled cough encouraged  Cough And Deep Breathing: done independently per patient  Intervention: Optimize Oxygenation and Ventilation  Flowsheets (Taken 9/17/2022 0058)  Airway/Ventilation Management: calming measures promoted  Head of Bed (HOB) Positioning: HOB at 30-45 degrees

## 2022-09-18 ENCOUNTER — PATIENT MESSAGE (OUTPATIENT)
Dept: ADMINISTRATIVE | Facility: OTHER | Age: 67
End: 2022-09-18
Payer: MEDICARE

## 2022-09-18 LAB
LEFT CCA DIST DIAS: 11 CM/S
LEFT CCA DIST SYS: 116 CM/S
LEFT CCA PROX DIAS: 16 CM/S
LEFT CCA PROX SYS: 109 CM/S
LEFT ECA DIAS: 5 CM/S
LEFT ECA SYS: 137 CM/S
LEFT ICA DIST DIAS: 24 CM/S
LEFT ICA DIST SYS: 87 CM/S
LEFT ICA MID DIAS: 14 CM/S
LEFT ICA MID SYS: 71 CM/S
LEFT ICA PROX DIAS: 15 CM/S
LEFT ICA PROX SYS: 82 CM/S
LEFT VERTEBRAL DIAS: 0 CM/S
LEFT VERTEBRAL SYS: 30 CM/S
OHS CV CAROTID RIGHT ICA EDV HIGHEST: 20
OHS CV CAROTID ULTRASOUND LEFT ICA/CCA RATIO: 0.75
OHS CV CAROTID ULTRASOUND RIGHT ICA/CCA RATIO: 1.03
OHS CV PV CAROTID LEFT HIGHEST CCA: 116
OHS CV PV CAROTID LEFT HIGHEST ICA: 87
OHS CV PV CAROTID RIGHT HIGHEST CCA: 116
OHS CV PV CAROTID RIGHT HIGHEST ICA: 119
OHS CV US CAROTID LEFT HIGHEST EDV: 24
RIGHT CCA DIST DIAS: 15 CM/S
RIGHT CCA DIST SYS: 116 CM/S
RIGHT CCA PROX DIAS: 10 CM/S
RIGHT CCA PROX SYS: 97 CM/S
RIGHT ECA DIAS: 0 CM/S
RIGHT ECA SYS: 148 CM/S
RIGHT ICA DIST DIAS: 9 CM/S
RIGHT ICA DIST SYS: 49 CM/S
RIGHT ICA MID DIAS: 20 CM/S
RIGHT ICA MID SYS: 119 CM/S
RIGHT ICA PROX DIAS: 16 CM/S
RIGHT ICA PROX SYS: 110 CM/S
RIGHT VERTEBRAL DIAS: 5 CM/S
RIGHT VERTEBRAL SYS: 45 CM/S

## 2022-09-20 ENCOUNTER — PATIENT OUTREACH (OUTPATIENT)
Dept: ADMINISTRATIVE | Facility: CLINIC | Age: 67
End: 2022-09-20
Payer: MEDICARE

## 2022-09-20 NOTE — PHYSICIAN QUERY
PT Name: Dallin Heath  MR #: [id number]     DOCUMENTATION CLARIFICATION     CDS: Dinora Cardenas RN, CCDS   Contact Information: [email or web address]    This form is a permanent document in the medical record.     Query Date: September 20, 2022    By submitting this query, we are merely seeking further clarification of documentation.  Please utilize your independent clinical judgment when addressing the question(s) below.  The Medical Record contains the following   Indicators   Supporting Clinical Findings Location in Medical Record    x SOB, HOWELL, Wheezing, Productive Cough, Use of Accessory Muscles, etc.  Positive for shortness of breath...SpO2 88%...mild expiratory wheezes. Crackles  9/14 ED, Dr. Monson    x RR      ABGs      O2 sat  9/15 1608: R ---, SpO2 90% no oxygen documented  Flowsheets    x Hypoxia/ Hypercapnia  Hypoxia  9/14 ED, Dr. Monson    BiPAP/Intubation/Mechanical Ventilation      x Supplemental O2  9/14 2305: R 20, SpO2 88% on 4L NC   9/14 2348: R 21, SpO2 95% on 5L Oxymask   9/15 0819: R ---, SpO2 97% on 4L Oxymask  Flowsheets    Home O2, Oxygen Dependence      x Respiratory distress or failure  Hypoxemic respiratory failure  9/15 Critical Care, NP [facility] / Dr. Mannie botello Radiology findings  CXR: small left pleural fluid with adjacent linear ground-glass opacity which may reflect atelectasis.   9/15 Radiology    x Acute/Chronic Illness  PMH HLD, Hypertension     67 year old male who presented to the Ed with c/o sob, syncope at home tonight.   Community acquired pneumonia vs atelectasis   Acute kidney injury   Lactic acidosis  9/15 Critical Care, NP [facility] / Dr. Mannie botello  Treatment  Continuous Oxygen  Orders    Other         The noted clinical guidelines are only system guidelines and do not replace the providers clinical judgment.    Provider, please specify the diagnosis or diagnoses associated with above clinical findings.     [ x   ] Acute Respiratory Failure with Hypoxia - ABG  pO2 < 60 mmHg or O2 sat of <91% on room air and respiratory symptoms documented   [    ] Acute Respiratory Failure, unspecified whether with hypoxia or hypercapnia   [    ] Respiratory Failure with hypoxia, unspecified acuity   [    ] Other Respiratory Diagnosis (please specify): _________________   [   ] Clinically Undetermined     Please document in your progress notes daily for the duration of treatment until resolved and include in your discharge summary.     Reference:    AURORA Go MD. (2020, March 13). Acute respiratory distress syndrome: Clinical features, diagnosis, and complications in adults ([id number] BINA Mendoza MD & [id number] ADAM Wilkinson MD, Eds.). Retrieved November 13, 2020, from [email or web address]-[email or web address]-distress-syndrome-[email or web address]-features-[email or web address]-and-complications-[email or web address]-[email or web address]?search=ards&source=search_result&selectedTitle=1~150&usage_type=default&display_rank=1  Form No. [id number]

## 2022-09-20 NOTE — PROGRESS NOTES
C3 nurse spoke with Dallin Heath   for a TCC post hospital discharge follow up call. The patient does not have a scheduled HOSFU appointment with pcp   within 5-7 days post hospital discharge date 09/24/2022. C3 nurse was unable to schedule HOSFU appointment in [facility].

## 2022-09-22 ENCOUNTER — PATIENT MESSAGE (OUTPATIENT)
Dept: ADMINISTRATIVE | Facility: OTHER | Age: 67
End: 2022-09-22
Payer: MEDICARE

## 2022-10-13 ENCOUNTER — PATIENT MESSAGE (OUTPATIENT)
Dept: ADMINISTRATIVE | Facility: OTHER | Age: 67
End: 2022-10-13
Payer: MEDICARE

## 2022-12-19 PROBLEM — J18.9 CAP (COMMUNITY ACQUIRED PNEUMONIA): Status: RESOLVED | Noted: 2022-09-15 | Resolved: 2022-12-19

## 2023-03-27 PROCEDURE — 93010 ELECTROCARDIOGRAM REPORT: CPT | Mod: ,,, | Performed by: STUDENT IN AN ORGANIZED HEALTH CARE EDUCATION/TRAINING PROGRAM

## 2023-03-27 PROCEDURE — 93005 ELECTROCARDIOGRAM TRACING: CPT

## 2023-03-27 PROCEDURE — 99284 EMERGENCY DEPT VISIT MOD MDM: CPT | Mod: 25

## 2023-03-27 PROCEDURE — 93010 EKG 12-LEAD: ICD-10-PCS | Mod: ,,, | Performed by: STUDENT IN AN ORGANIZED HEALTH CARE EDUCATION/TRAINING PROGRAM

## 2023-03-28 ENCOUNTER — HOSPITAL ENCOUNTER (EMERGENCY)
Facility: HOSPITAL | Age: 68
Discharge: HOME | End: 2023-03-28
Attending: EMERGENCY MEDICINE
Payer: MEDICARE

## 2023-03-28 VITALS
HEART RATE: 76 BPM | OXYGEN SATURATION: 97 % | TEMPERATURE: 98 F | RESPIRATION RATE: 20 BRPM | DIASTOLIC BLOOD PRESSURE: 94 MMHG | SYSTOLIC BLOOD PRESSURE: 174 MMHG

## 2023-03-28 DIAGNOSIS — I10 HYPERTENSION, UNSPECIFIED TYPE: Primary | ICD-10-CM

## 2023-03-28 LAB
ALBUMIN SERPL-MCNC: 3.8 G/DL (ref 3.4–4.8)
ALBUMIN/GLOB SERPL: 1.3 RATIO (ref 1.1–2)
ALP SERPL-CCNC: 75 UNIT/L (ref 40–150)
ALT SERPL-CCNC: 13 UNIT/L (ref 0–55)
AST SERPL-CCNC: 21 UNIT/L (ref 5–34)
BASOPHILS # BLD AUTO: 0.03 X10(3)/MCL (ref 0–0.2)
BASOPHILS NFR BLD AUTO: 0.5 %
BILIRUBIN DIRECT+TOT PNL SERPL-MCNC: 1.2 MG/DL
BUN SERPL-MCNC: 15.1 MG/DL (ref 8.4–25.7)
CALCIUM SERPL-MCNC: 9.7 MG/DL (ref 8.8–10)
CHLORIDE SERPL-SCNC: 109 MMOL/L (ref 98–107)
CO2 SERPL-SCNC: 24 MMOL/L (ref 23–31)
CREAT SERPL-MCNC: 1 MG/DL (ref 0.73–1.18)
EOSINOPHIL # BLD AUTO: 0.19 X10(3)/MCL (ref 0–0.9)
EOSINOPHIL NFR BLD AUTO: 3 %
ERYTHROCYTE [DISTWIDTH] IN BLOOD BY AUTOMATED COUNT: 13.4 % (ref 11.5–17)
GFR SERPLBLD CREATININE-BSD FMLA CKD-EPI: >60 MLS/MIN/1.73/M2
GLOBULIN SER-MCNC: 2.9 GM/DL (ref 2.4–3.5)
GLUCOSE SERPL-MCNC: 99 MG/DL (ref 82–115)
HCT VFR BLD AUTO: 39.8 % (ref 42–52)
HGB BLD-MCNC: 13.1 G/DL (ref 14–18)
IMM GRANULOCYTES # BLD AUTO: 0.01 X10(3)/MCL (ref 0–0.04)
IMM GRANULOCYTES NFR BLD AUTO: 0.2 %
LYMPHOCYTES # BLD AUTO: 1.27 X10(3)/MCL (ref 0.6–4.6)
LYMPHOCYTES NFR BLD AUTO: 20.4 %
MCH RBC QN AUTO: 29.7 PG (ref 27–31)
MCHC RBC AUTO-ENTMCNC: 32.9 G/DL (ref 33–36)
MCV RBC AUTO: 90.2 FL (ref 80–94)
MONOCYTES # BLD AUTO: 0.58 X10(3)/MCL (ref 0.1–1.3)
MONOCYTES NFR BLD AUTO: 9.3 %
NEUTROPHILS # BLD AUTO: 4.15 X10(3)/MCL (ref 2.1–9.2)
NEUTROPHILS NFR BLD AUTO: 66.6 %
NRBC BLD AUTO-RTO: 0 %
PLATELET # BLD AUTO: 290 X10(3)/MCL (ref 130–400)
PMV BLD AUTO: 11.6 FL (ref 7.4–10.4)
POTASSIUM SERPL-SCNC: 4.1 MMOL/L (ref 3.5–5.1)
PROT SERPL-MCNC: 6.7 GM/DL (ref 5.8–7.6)
RBC # BLD AUTO: 4.41 X10(6)/MCL (ref 4.7–6.1)
SODIUM SERPL-SCNC: 141 MMOL/L (ref 136–145)
WBC # SPEC AUTO: 6.2 X10(3)/MCL (ref 4.5–11.5)

## 2023-03-28 PROCEDURE — 80053 COMPREHEN METABOLIC PANEL: CPT | Performed by: NURSE PRACTITIONER

## 2023-03-28 PROCEDURE — 85025 COMPLETE CBC W/AUTO DIFF WBC: CPT | Performed by: NURSE PRACTITIONER

## 2023-03-28 NOTE — FIRST PROVIDER EVALUATION
Medical screening examination initiated.  I have conducted a focused provider triage encounter, findings are as follows:    Brief history of present illness:  Patient presents to the emergency room with elevated blood pressure from home    Vitals:    03/28/23 0005   BP: (!) 166/99   Pulse: 75   Resp: 18   Temp: 97.7 °F (36.5 °C)   TempSrc: Temporal   SpO2: 96%       Pertinent physical exam:  Awake, alert, oriented x3.  Trach midline.  No acute distress.  No abdominal distention.  Movement of all extremities.  Psych normal.  Neuro intact.    Brief workup plan:  Lab studies and further evaluation by ED provider    Preliminary workup initiated; this workup will be continued and followed by the physician or advanced practice provider that is assigned to the patient when roomed.

## 2023-03-28 NOTE — ED PROVIDER NOTES
Encounter Date: 3/27/2023    SCRIBE #1 NOTE: I, Jossie Bermeo, am scribing for, and in the presence of,  Emiliano Keith MD. I have scribed the following portions of the note - Other sections scribed: HPI, ROS, PE.     History     Chief Complaint   Patient presents with    Hypertension     BP elevated at home. Just started on clonidine today (3/27). Has nuclear stress test with Dr. Dixon tomorrow 3/28. Denies complaints     67 year old male with history of HLD and HTN presents to the ED with complaints of HTN. Pt reports that he started Clonidine yesterday, 3/27/23. He notes that he checked his BP 9 times yesterday, and it got higher each time. He denies any other symptoms and notes that he has a stress test scheduled for today.     The history is provided by the patient. No  was used.   Hypertension   This is a chronic problem. The current episode started yesterday. The problem has been unchanged. Pertinent negatives include no chest pain, no confusion, no headaches, no dizziness and no shortness of breath.   Review of patient's allergies indicates:  No Known Allergies  Past Medical History:   Diagnosis Date    HLD (hyperlipidemia)     Hypertension      No past surgical history on file.  No family history on file.  Social History     Tobacco Use    Smoking status: Former     Types: Cigarettes    Smokeless tobacco: Never   Substance Use Topics    Alcohol use: Yes    Drug use: Never     Review of Systems   Constitutional:  Negative for chills and fever.   HENT:  Negative for congestion and ear pain.    Eyes:  Negative for discharge.   Respiratory:  Negative for cough, shortness of breath and wheezing.    Cardiovascular:  Negative for chest pain and leg swelling.   Gastrointestinal:  Negative for abdominal pain, constipation, diarrhea, nausea and vomiting.   Genitourinary:  Negative for dysuria, flank pain and frequency.   Musculoskeletal:  Negative for back pain and joint swelling.   Skin:   Negative for rash.   Neurological:  Negative for dizziness, weakness and headaches.   Psychiatric/Behavioral:  Negative for agitation, confusion and hallucinations.      Physical Exam     Initial Vitals [03/28/23 0005]   BP Pulse Resp Temp SpO2   (!) 166/99 75 18 97.7 °F (36.5 °C) 96 %      MAP       --         Physical Exam    Constitutional: He appears well-developed and well-nourished. No distress.   HENT:   Head: Normocephalic and atraumatic.   Eyes: Conjunctivae and EOM are normal. Pupils are equal, round, and reactive to light. Right eye exhibits no discharge. Left eye exhibits no discharge. No scleral icterus.   Neck: No tracheal deviation present.   Cardiovascular:  Normal rate, regular rhythm, normal heart sounds and intact distal pulses.           No murmur heard.  Pulmonary/Chest: Breath sounds normal. No stridor. No respiratory distress. He has no wheezes. He has no rales.   Abdominal: Abdomen is soft. He exhibits no distension. There is no abdominal tenderness. There is no rebound and no guarding.   Musculoskeletal:         General: No tenderness or edema. Normal range of motion.     Neurological: He is alert and oriented to person, place, and time. He has normal strength and normal reflexes. No cranial nerve deficit.   Skin: Skin is warm and dry. No rash noted. No erythema. No pallor.   Psychiatric: He has a normal mood and affect. His behavior is normal. Judgment and thought content normal.       ED Course   Procedures  Labs Reviewed   COMPREHENSIVE METABOLIC PANEL - Abnormal; Notable for the following components:       Result Value    Chloride 109 (*)     All other components within normal limits   CBC WITH DIFFERENTIAL - Abnormal; Notable for the following components:    RBC 4.41 (*)     Hgb 13.1 (*)     Hct 39.8 (*)     MCHC 32.9 (*)     MPV 11.6 (*)     All other components within normal limits   CBC W/ AUTO DIFFERENTIAL    Narrative:     The following orders were created for panel order CBC auto  differential.  Procedure                               Abnormality         Status                     ---------                               -----------         ------                     CBC with Differential[[id number]]        Abnormal            Final result                 Please view results for these tests on the individual orders.     EKG Readings: (Independently Interpreted)   Initial Reading: No STEMI. Rhythm: Normal Sinus Rhythm. Heart Rate: 75. Ectopy: No Ectopy. Conduction: Normal. ST Segments: Normal ST Segments. T Waves: Normal. Clinical Impression: Normal Sinus Rhythm   EKG performed at 23:57 on 3/27/23.    ECG Results              EKG 12-LEAD (Final result)  Result time 04/04/23 10:45:22      Final result by Unknown User (04/04/23 10:45:22)                                         EKG 12-lead (Final result)  Result time 03/28/23 12:19:21      Final result by Interface, Lab In [facility] (03/28/23 12:19:21)                   Narrative:    Test Reason : I10,    Vent. Rate : 075 BPM     Atrial Rate : 075 BPM     P-R Int : 192 ms          QRS Dur : 088 ms      QT Int : 408 ms       P-R-T Axes : 055 003 042 degrees     QTc Int : 455 ms    Normal sinus rhythm  Normal ECG  Confirmed by Sorin Ruiz MD ([id number]) on 3/28/2023 12:19:12 PM    Referred By:             Confirmed By:Sorin Ruiz MD                                  Imaging Results    None          Medications - No data to display             Attending Attestation:           Physician Attestation for Scribe:  Physician Attestation Statement for Scribe #1: I, Emiliano Keith MD, reviewed documentation, as scribed by Jossie Bermeo in my presence, and it is both accurate and complete.       Medical Decision Making      Differential diagnosis includes but is not limited to HTN, anxiety     Amount and/or Complexity of Data Reviewed  External Data Reviewed: notes.  Labs: ordered.                         Clinical Impression:   Final diagnoses:  [I10]  Hypertension, unspecified type (Primary)        ED Disposition Condition    Discharge Stable          ED Prescriptions    None       Follow-up Information       Follow up With Specialties Details Why Contact Info    PCP  Call in 1 day  follow up with PCP ni 1-2 days             Emiliano Keith MD  04/05/23 [id number]

## 2024-09-01 ENCOUNTER — OFFICE VISIT (OUTPATIENT)
Dept: URGENT CARE | Facility: CLINIC | Age: 69
End: 2024-09-01
Payer: MEDICARE

## 2024-09-01 VITALS
RESPIRATION RATE: 18 BRPM | TEMPERATURE: 98 F | HEART RATE: 76 BPM | OXYGEN SATURATION: 96 % | SYSTOLIC BLOOD PRESSURE: 149 MMHG | HEIGHT: 68 IN | DIASTOLIC BLOOD PRESSURE: 101 MMHG | WEIGHT: 240 LBS | BODY MASS INDEX: 36.37 KG/M2

## 2024-09-01 DIAGNOSIS — I10 PRIMARY HYPERTENSION: Primary | ICD-10-CM

## 2024-09-01 DIAGNOSIS — R05.1 ACUTE COUGH: ICD-10-CM

## 2024-09-01 LAB
EKG 12-LEAD: NORMAL
PR INTERVAL: NORMAL
PRT AXES: NORMAL
QRS DURATION: NORMAL
QT/QTC: NORMAL
VENTRICULAR RATE: NORMAL

## 2024-09-01 RX ORDER — DICLOFENAC SODIUM 75 MG/1
75 TABLET, DELAYED RELEASE ORAL EVERY 12 HOURS PRN
COMMUNITY
Start: 2024-06-11

## 2024-09-01 RX ORDER — AA/PROT/LYSINE/METHIO/VIT C/B6 50-12.5 MG
200 TABLET ORAL DAILY
COMMUNITY

## 2024-09-01 RX ORDER — NITROFURANTOIN 25; 75 MG/1; MG/1
100 CAPSULE ORAL NIGHTLY
COMMUNITY
Start: 2024-08-05

## 2024-09-01 RX ORDER — NAPROXEN SODIUM 220 MG/1
81 TABLET, FILM COATED ORAL DAILY
COMMUNITY

## 2024-09-01 RX ORDER — TAMSULOSIN HYDROCHLORIDE 0.4 MG/1
2 CAPSULE ORAL NIGHTLY
COMMUNITY
Start: 2024-08-19

## 2024-09-01 RX ORDER — PANTOPRAZOLE SODIUM 40 MG/1
40 TABLET, DELAYED RELEASE ORAL 2 TIMES DAILY
COMMUNITY
Start: 2024-08-28

## 2024-09-01 RX ORDER — EVOLOCUMAB 140 MG/ML
INJECTION, SOLUTION SUBCUTANEOUS
COMMUNITY
Start: 2024-08-21

## 2024-09-01 RX ORDER — BISOPROLOL FUMARATE 5 MG/1
2.5 TABLET, FILM COATED ORAL 2 TIMES DAILY
COMMUNITY
Start: 2024-08-05

## 2024-09-01 NOTE — PATIENT INSTRUCTIONS
patient states will go to ER for evaluation of these hot feelings in his body which are intermittent., no palpitations.     chest x-ray no acute findings left lower lung density improved from the last chest x-ray.  Coricidin OTC for cough as directed

## 2024-09-01 NOTE — PROGRESS NOTES
"Subjective:      Patient ID: Dallin Heath is a 69 y.o. male.    Vitals:  height is 5' 8" (1.727 m) and weight is 108.9 kg (240 lb). His oral temperature is 97.8 °F (36.6 °C). His blood pressure is 167/99 (abnormal) and his pulse is 84. His respiration is 18 and oxygen saturation is 96%.     [place] [facility] [place] is a 69 y.o. male who presents to urgent care with complaints of "warm feeling through body" sensation earlier today with home BP readings of 125/99 and later 200/100.  Intermittent cough.  Alleviating factors include prescribed blood pressure medications with no relief. Patient denies chest pain or shortness of breath . He states he has been taking OTC cold medicine including Nyquil for a few days for sinus symptoms.     Respiratory:  Positive for cough.       Objective:     Physical Exam   Constitutional: He is oriented to person, place, and time. He appears well-developed. He is cooperative.  Non-toxic appearance. He does not appear ill. No distress.   HENT:   Head: Normocephalic and atraumatic.   Ears:   Right Ear: Hearing, tympanic membrane, external ear and ear canal normal.   Left Ear: Hearing, tympanic membrane, external ear and ear canal normal.   Nose: Nose normal. No mucosal edema, rhinorrhea or nasal deformity. No epistaxis. Right sinus exhibits no maxillary sinus tenderness and no frontal sinus tenderness. Left sinus exhibits no maxillary sinus tenderness and no frontal sinus tenderness.   Mouth/Throat: Uvula is midline, oropharynx is clear and moist and mucous membranes are normal. No trismus in the jaw. Normal dentition. No uvula swelling. No oropharyngeal exudate, posterior oropharyngeal edema or posterior oropharyngeal erythema.   Eyes: Conjunctivae and lids are normal. No scleral icterus.   Neck: Trachea normal and phonation normal. Neck supple. No edema present. No erythema present. No neck rigidity present.   Cardiovascular: Normal rate, regular rhythm, normal heart " sounds and normal pulses.   Pulmonary/Chest: Effort normal and breath sounds normal. No respiratory distress. He has no decreased breath sounds. He has no rhonchi.   Abdominal: Normal appearance.   Musculoskeletal: Normal range of motion.         General: No deformity. Normal range of motion.   Neurological: He is alert and oriented to person, place, and time. He exhibits normal muscle tone. Coordination normal.   Skin: Skin is warm, dry, intact, not diaphoretic and not pale.   Psychiatric: His speech is normal and behavior is normal. Judgment and thought content normal.   Nursing note and vitals reviewed.    Previous History:     Review of patient's allergies indicates:  No Known Allergies    Past Medical History:   Diagnosis Date    Arthritis     GERD (gastroesophageal reflux disease)     HLD (hyperlipidemia)     Hypertension      Current Outpatient Medications   Medication Instructions    acetaminophen-codeine 300-30mg (TYLENOL #3) 300-30 mg Tab 1 tablet    amLODIPine (NORVASC) 2.5 mg, Oral, Daily    aspirin 81 mg, Oral, Daily    atorvastatin (LIPITOR) 10 MG tablet Oral    bisoprolol (ZEBETA) 2.5 mg, Oral, 2 times daily    bisoprolol-hydrochlorothiazide 5-6.25 mg (ZIAC) 5-6.25 mg Tab 1 tablet, Oral, Daily    clotrimazole-betamethasone 1-0.05% (LOTRISONE) cream APPLY TO THE AFFECTED AREA(S) TWICE DAILY    COD LIVER OIL ORAL Oral    coenzyme Q10 (CO Q-10) 200 mg, Oral, Daily    diclofenac (VOLTAREN) 75 mg, Oral, Every 12 hours PRN    isosorbide dinitrate (ISORDIL) 5 mg, Oral, Daily    nitrofurantoin, macrocrystal-monohydrate, (MACROBID) 100 MG capsule 100 mg, Oral, Nightly    pantoprazole (PROTONIX) 40 mg, Oral, 2 times daily    prazosin (MINIPRESS) 2 mg, 2 times daily    REPATHA SURECLICK 140 mg/mL PnIj SMARTSI Pre-Filled Pen Syringe SUB-Q Every 2 Weeks    tamsulosin (FLOMAX) 0.4 mg Cap 2 capsules, Oral, Nightly     Past Surgical History:   Procedure Laterality Date    NO PAST SURGERIES       Family History    Problem Relation Name Age of Onset    Hypertension Mother      Hypertension Father         Social History     Tobacco Use    Smoking status: Former     Types: Cigarettes    Smokeless tobacco: Never   Substance Use Topics    Alcohol use: Yes     Comment: A few beers daily    Drug use: Never     Assessment:     1. Primary hypertension    2. Acute cough        Plan:   EKG sinus rhythm heartbeats 71 beats per minute, no chest pain or shortness a breath.  patient states will go to ER for evaluation of these hot feelings in his body which are intermittent., no palpitations.  EKG no acute findings left lower lung density improved from the last chest x-ray  Coricidin OTC for cough as directed  Primary hypertension  -     POCT EKG 12-LEAD (Manually Resulted by Ordering Provider)  -     XR CHEST PA AND LATERAL; Future; Expected date: 09/01/2024    Acute cough  -     XR CHEST PA AND LATERAL; Future; Expected date: 09/01/2024